# Patient Record
Sex: FEMALE | Race: BLACK OR AFRICAN AMERICAN | Employment: UNEMPLOYED | URBAN - METROPOLITAN AREA
[De-identification: names, ages, dates, MRNs, and addresses within clinical notes are randomized per-mention and may not be internally consistent; named-entity substitution may affect disease eponyms.]

---

## 2017-12-15 ENCOUNTER — APPOINTMENT (OUTPATIENT)
Dept: GENERAL RADIOLOGY | Age: 42
End: 2017-12-15
Attending: PHYSICIAN ASSISTANT
Payer: SELF-PAY

## 2017-12-15 ENCOUNTER — APPOINTMENT (OUTPATIENT)
Dept: CT IMAGING | Age: 42
End: 2017-12-15
Attending: PHYSICIAN ASSISTANT
Payer: SELF-PAY

## 2017-12-15 ENCOUNTER — HOSPITAL ENCOUNTER (EMERGENCY)
Age: 42
Discharge: HOME | End: 2017-12-15
Attending: EMERGENCY MEDICINE
Payer: SELF-PAY

## 2017-12-15 VITALS
OXYGEN SATURATION: 99 % | TEMPERATURE: 98.5 F | RESPIRATION RATE: 14 BRPM | WEIGHT: 245 LBS | HEIGHT: 69 IN | HEART RATE: 78 BPM | SYSTOLIC BLOOD PRESSURE: 126 MMHG | DIASTOLIC BLOOD PRESSURE: 94 MMHG | BODY MASS INDEX: 36.29 KG/M2

## 2017-12-15 DIAGNOSIS — F17.200 NICOTINE DEPENDENCE, UNCOMPLICATED, UNSPECIFIED NICOTINE PRODUCT TYPE: ICD-10-CM

## 2017-12-15 DIAGNOSIS — Z86.73 HISTORY OF CVA (CEREBROVASCULAR ACCIDENT): ICD-10-CM

## 2017-12-15 DIAGNOSIS — M79.10 MYALGIA: Primary | ICD-10-CM

## 2017-12-15 DIAGNOSIS — R20.0 NUMBNESS: ICD-10-CM

## 2017-12-15 LAB
ALBUMIN SERPL-MCNC: 3.3 G/DL (ref 3.5–5)
ALBUMIN/GLOB SERPL: 1 {RATIO} (ref 1.1–2.2)
ALP SERPL-CCNC: 66 U/L (ref 45–117)
ALT SERPL-CCNC: 15 U/L (ref 12–78)
ANION GAP SERPL CALC-SCNC: 14 MMOL/L (ref 5–15)
APPEARANCE UR: ABNORMAL
AST SERPL-CCNC: 11 U/L (ref 15–37)
ATRIAL RATE: 85 BPM
BACTERIA URNS QL MICRO: ABNORMAL /HPF
BASOPHILS # BLD: 0 K/UL (ref 0–0.1)
BASOPHILS NFR BLD: 0 % (ref 0–1)
BILIRUB SERPL-MCNC: 0.5 MG/DL (ref 0.2–1)
BILIRUB UR QL CFM: NEGATIVE
BUN SERPL-MCNC: 10 MG/DL (ref 6–20)
BUN/CREAT SERPL: 11 (ref 12–20)
CALCIUM SERPL-MCNC: 8.2 MG/DL (ref 8.5–10.1)
CALCULATED P AXIS, ECG09: 35 DEGREES
CALCULATED R AXIS, ECG10: -21 DEGREES
CALCULATED T AXIS, ECG11: 8 DEGREES
CHLORIDE SERPL-SCNC: 108 MMOL/L (ref 97–108)
CK MB CFR SERPL CALC: 2.1 % (ref 0–2.5)
CK MB SERPL-MCNC: 1.9 NG/ML (ref 5–25)
CK SERPL-CCNC: 91 U/L (ref 26–192)
CO2 SERPL-SCNC: 19 MMOL/L (ref 21–32)
COLOR UR: ABNORMAL
CREAT SERPL-MCNC: 0.87 MG/DL (ref 0.55–1.02)
DIAGNOSIS, 93000: NORMAL
EOSINOPHIL # BLD: 0.1 K/UL (ref 0–0.4)
EOSINOPHIL NFR BLD: 1 % (ref 0–7)
EPITH CASTS URNS QL MICRO: ABNORMAL /LPF
ERYTHROCYTE [DISTWIDTH] IN BLOOD BY AUTOMATED COUNT: 12.5 % (ref 11.5–14.5)
GLOBULIN SER CALC-MCNC: 3.4 G/DL (ref 2–4)
GLUCOSE SERPL-MCNC: 96 MG/DL (ref 65–100)
GLUCOSE UR STRIP.AUTO-MCNC: NEGATIVE MG/DL
HCT VFR BLD AUTO: 36.5 % (ref 35–47)
HGB BLD-MCNC: 12.8 G/DL (ref 11.5–16)
HGB UR QL STRIP: ABNORMAL
KETONES UR QL STRIP.AUTO: ABNORMAL MG/DL
LEUKOCYTE ESTERASE UR QL STRIP.AUTO: NEGATIVE
LYMPHOCYTES # BLD: 2.5 K/UL (ref 0.8–3.5)
LYMPHOCYTES NFR BLD: 24 % (ref 12–49)
MAGNESIUM SERPL-MCNC: 1.8 MG/DL (ref 1.6–2.4)
MCH RBC QN AUTO: 31.7 PG (ref 26–34)
MCHC RBC AUTO-ENTMCNC: 35.1 G/DL (ref 30–36.5)
MCV RBC AUTO: 90.3 FL (ref 80–99)
MONOCYTES # BLD: 0.6 K/UL (ref 0–1)
MONOCYTES NFR BLD: 6 % (ref 5–13)
NEUTS SEG # BLD: 7 K/UL (ref 1.8–8)
NEUTS SEG NFR BLD: 69 % (ref 32–75)
NITRITE UR QL STRIP.AUTO: NEGATIVE
P-R INTERVAL, ECG05: 158 MS
PH UR STRIP: 6 [PH] (ref 5–8)
PLATELET # BLD AUTO: 182 K/UL (ref 150–400)
POTASSIUM SERPL-SCNC: 3.5 MMOL/L (ref 3.5–5.1)
PROT SERPL-MCNC: 6.7 G/DL (ref 6.4–8.2)
PROT UR STRIP-MCNC: 30 MG/DL
Q-T INTERVAL, ECG07: 388 MS
QRS DURATION, ECG06: 66 MS
QTC CALCULATION (BEZET), ECG08: 461 MS
RBC # BLD AUTO: 4.04 M/UL (ref 3.8–5.2)
RBC #/AREA URNS HPF: ABNORMAL /HPF (ref 0–5)
SODIUM SERPL-SCNC: 141 MMOL/L (ref 136–145)
SP GR UR REFRACTOMETRY: >1.03 (ref 1–1.03)
TROPONIN I SERPL-MCNC: <0.04 NG/ML
UR CULT HOLD, URHOLD: NORMAL
UROBILINOGEN UR QL STRIP.AUTO: 1 EU/DL (ref 0.2–1)
VENTRICULAR RATE, ECG03: 85 BPM
WBC # BLD AUTO: 10.1 K/UL (ref 3.6–11)
WBC URNS QL MICRO: ABNORMAL /HPF (ref 0–4)

## 2017-12-15 PROCEDURE — 81001 URINALYSIS AUTO W/SCOPE: CPT | Performed by: PHYSICIAN ASSISTANT

## 2017-12-15 PROCEDURE — 93005 ELECTROCARDIOGRAM TRACING: CPT

## 2017-12-15 PROCEDURE — 74011250636 HC RX REV CODE- 250/636: Performed by: PHYSICIAN ASSISTANT

## 2017-12-15 PROCEDURE — 84484 ASSAY OF TROPONIN QUANT: CPT | Performed by: PHYSICIAN ASSISTANT

## 2017-12-15 PROCEDURE — 74011250637 HC RX REV CODE- 250/637: Performed by: PHYSICIAN ASSISTANT

## 2017-12-15 PROCEDURE — 36415 COLL VENOUS BLD VENIPUNCTURE: CPT | Performed by: PHYSICIAN ASSISTANT

## 2017-12-15 PROCEDURE — 71020 XR CHEST PA LAT: CPT

## 2017-12-15 PROCEDURE — 85025 COMPLETE CBC W/AUTO DIFF WBC: CPT | Performed by: PHYSICIAN ASSISTANT

## 2017-12-15 PROCEDURE — 70450 CT HEAD/BRAIN W/O DYE: CPT

## 2017-12-15 PROCEDURE — 99285 EMERGENCY DEPT VISIT HI MDM: CPT

## 2017-12-15 PROCEDURE — 96360 HYDRATION IV INFUSION INIT: CPT

## 2017-12-15 PROCEDURE — 83735 ASSAY OF MAGNESIUM: CPT | Performed by: PHYSICIAN ASSISTANT

## 2017-12-15 PROCEDURE — 80053 COMPREHEN METABOLIC PANEL: CPT | Performed by: PHYSICIAN ASSISTANT

## 2017-12-15 PROCEDURE — 82550 ASSAY OF CK (CPK): CPT | Performed by: PHYSICIAN ASSISTANT

## 2017-12-15 RX ORDER — HALOPERIDOL 5 MG/ML
5 INJECTION INTRAMUSCULAR ONCE
Status: ON HOLD | COMMUNITY
End: 2021-02-21

## 2017-12-15 RX ORDER — ASPIRIN 81 MG/1
TABLET ORAL DAILY
COMMUNITY
End: 2021-01-16

## 2017-12-15 RX ORDER — ATORVASTATIN CALCIUM 10 MG/1
40 TABLET, FILM COATED ORAL DAILY
COMMUNITY

## 2017-12-15 RX ORDER — ACETAMINOPHEN 325 MG/1
650 TABLET ORAL ONCE
Status: COMPLETED | OUTPATIENT
Start: 2017-12-15 | End: 2017-12-15

## 2017-12-15 RX ORDER — TOPIRAMATE 100 MG/1
100 TABLET, FILM COATED ORAL 2 TIMES DAILY
COMMUNITY
End: 2021-01-16

## 2017-12-15 RX ORDER — TRAZODONE HYDROCHLORIDE 100 MG/1
75 TABLET ORAL
COMMUNITY
End: 2021-01-16

## 2017-12-15 RX ORDER — GABAPENTIN 600 MG/1
600 TABLET ORAL 2 TIMES DAILY
COMMUNITY
End: 2021-01-16

## 2017-12-15 RX ADMIN — SODIUM CHLORIDE 500 ML: 900 INJECTION, SOLUTION INTRAVENOUS at 15:27

## 2017-12-15 RX ADMIN — ACETAMINOPHEN 650 MG: 325 TABLET ORAL at 15:27

## 2017-12-15 NOTE — ED NOTES
Patient discharged by provider. D/C instructions given. Pt educated to take r medications as instructed for management at home. Pt verbalized understanding, verbalized no questions. PIV removed, pressure dressing applied. Pt ambulated out of ER without difficulty, NAD.   Patient Vitals for the past 4 hrs:   Pulse Resp BP SpO2   12/15/17 1700 78 14 (!) 126/94 99 %   12/15/17 1630 82 16 125/90 100 %   12/15/17 1530 78 17 105/69 98 %   12/15/17 1430 79 19 116/81 98 %

## 2017-12-15 NOTE — ED PROVIDER NOTES
HPI Comments: 36 y.o. female with past medical history significant for asthma, HTN, bipolar I disorder, migraine, TIA and stroke who presents from home with chief complaint of numbness. Per pt, she has been experiencing bilateral arm and leg numbness since her CVA in August of 2017. At that time she notes that physicians were unable to determine if she had suffered a hemorrhagic stroke, however their initial belief was linked to a possible aneurysm. Per pt, she has experienced left sided deficits from her previous stroke, however these appear unchanged. The pt reports that she was started on a cholesterol medication following diagnosis of her CVA and unsure if she has been experiencing side effects. The pt notes that she has not followed up with her PCP for evaluation PTA to the ED. Per mother, the pt has also been experiencing intermittent SOB with exertion over the past few days. The mother notes that the pt has also appeared more \"blunted\" and less like herself since being started on Haldol for her mood. Per pt, she has yet to discuss this with her PCP. The pt makes it known that she has an upcoming Neurology appointment in early January at [address] [place] [address]. Per pt, she usually ambulated with a walker at baseline due to her deficits. She denies fever, chills, cp, SOB, n/v/d, urinary sx or abd pain. There are no other acute medical concerns at this time. Social hx: Current daily smoker (1/2 pack), No ETOH use    PCP: Emmanuel Barr MD    Note written by Katherine Giron, as dictated by Barrington Perry PA-C 3:25 PM            The history is provided by the patient. No  was used. Past Medical History:   Diagnosis Date    Asthma     Bipolar 1 disorder ([facility] [address]     Hypertension     Migraine     Stroke [facility])     2017 august hemmorragic stroke    TIA (transient ischemic attack)        History reviewed. No pertinent surgical history. History reviewed. No pertinent family history.     Social History     Social History    Marital status:      Spouse name: N/A    Number of children: N/A    Years of education: N/A     Occupational History    Not on file. Social History Main Topics    Smoking status: Current Every Day Smoker     Packs/day: 0.50    Smokeless tobacco: Never Used    Alcohol use No    Drug use: No    Sexual activity: Not on file     Other Topics Concern    Not on file     Social History Narrative         ALLERGIES: Imitrex [sumatriptan succinate] and Tramadol    Review of Systems   Constitutional: Negative. Negative for appetite change, chills, fatigue and fever. HENT: Negative. Negative for congestion, ear pain, postnasal drip, rhinorrhea, sneezing and sore throat. Eyes: Negative. Negative for redness and visual disturbance. Respiratory: Positive for shortness of breath (intermittnent with exersion over the past several days ). Negative for cough and wheezing. Cardiovascular: Negative. Negative for chest pain, palpitations and leg swelling. Gastrointestinal: Negative. Negative for abdominal pain, anal bleeding, constipation, diarrhea, nausea and vomiting. Endocrine: Negative. Genitourinary: Negative. Negative for difficulty urinating, dysuria, frequency and hematuria. Musculoskeletal: Positive for myalgias. Negative for arthralgias, back pain and neck stiffness. Skin: Negative. Negative for rash. Allergic/Immunologic: Negative. Negative for immunocompromised state. Neurological: Positive for numbness. Negative for dizziness, syncope, weakness, light-headedness and headaches. Hematological: Negative. Negative for adenopathy. Psychiatric/Behavioral: Negative.         Patient Vitals for the past 12 hrs:   Temp Pulse Resp BP SpO2   12/15/17 1700 - 78 14 (!) 126/94 99 %   12/15/17 1630 - 82 16 125/90 100 %   12/15/17 1530 - 78 17 105/69 98 %   12/15/17 1430 - 79 19 116/81 98 %   12/15/17 1316 98.5 °F (36.9 °C) 64 18 122/78 100 % Physical Exam   Constitutional: She is oriented to person, place, and time. She appears well-developed and well-nourished. No distress. Above average bmi female   HENT:   Head: Normocephalic and atraumatic. Right Ear: External ear normal.   Left Ear: External ear normal.   Nose: Nose normal.   Mouth/Throat: Oropharynx is clear and moist.   Eyes: EOM are normal. Pupils are equal, round, and reactive to light. Neck: Neck supple. Cardiovascular: Normal rate, regular rhythm, normal heart sounds and intact distal pulses. Exam reveals no gallop and no friction rub. No murmur heard. Pulmonary/Chest: Effort normal and breath sounds normal. No stridor. No respiratory distress. She has no wheezes. She has no rales. She exhibits no tenderness. Abdominal: Soft. Bowel sounds are normal. She exhibits no distension and no mass. There is no tenderness. There is no rebound and no guarding. Musculoskeletal: Normal range of motion. She exhibits no edema, tenderness or deformity. Neurological: She is alert and oriented to person, place, and time. No cranial nerve deficit. Coordination normal.   Right sided baseline weakness. + diffuse aching bilaterally worse with movement. No focal ttp. No swelling. No discoloration or lesions. ROM intact. Cap refill brisk normothermic. Distal n/v intact. Skin: No rash noted. No erythema. No pallor. Psychiatric: She has a normal mood and affect. Her behavior is normal.   Nursing note and vitals reviewed.        MDM  Number of Diagnoses or Management Options  History of CVA (cerebrovascular accident):   Myalgia:   Nicotine dependence, uncomplicated, unspecified nicotine product type:   Numbness:      Amount and/or Complexity of Data Reviewed  Clinical lab tests: ordered and reviewed  Tests in the radiology section of CPT®: ordered and reviewed  Tests in the medicine section of CPT®: reviewed and ordered  Obtain history from someone other than the patient: yes (mother)  Review and summarize past medical records: yes  Independent visualization of images, tracings, or specimens: yes    Patient Progress  Patient progress: stable    ED Course       Procedures    1:44 PM  Discussed with the patient the medical risks of prolonged smoking habits and advised the patient of the benefits of the cessation of smoking. The patient verbalized their understanding. CIRO Martinez      ED EKG interpretation: 1:57 PM  Rhythm: normal sinus rhythm; and regular . Rate (approx.): 85; Axis: normal; P wave: normal; QRS interval: normal ; ST/T wave: non specific changes; Other findings: abnormal ekg. This EKG was interpreted by Rizwan Pulido MD,ED Provider. 2:28 PM  Discussed pt, sx, hx and current findings with Dr Erick Pulido. He is in agreement with plan. Will check ekg,labs and urine   Iza Dent. NAVJOT Wilcox    4:54 PM   Updated pt and family on current findings. Pt feeling much better. Will discharge home. Iza Wilcox PA-C    LABORATORY TESTS:  Recent Results (from the past 12 hour(s))   CBC WITH AUTOMATED DIFF    Collection Time: 12/15/17  1:50 PM   Result Value Ref Range    WBC 10.1 3.6 - 11.0 K/uL    RBC 4.04 3.80 - 5.20 M/uL    HGB 12.8 11.5 - 16.0 g/dL    HCT 36.5 35.0 - 47.0 %    MCV 90.3 80.0 - 99.0 FL    MCH 31.7 26.0 - 34.0 PG    MCHC 35.1 30.0 - 36.5 g/dL    RDW 12.5 11.5 - 14.5 %    PLATELET 478 724 - 108 K/uL    NEUTROPHILS 69 32 - 75 %    LYMPHOCYTES 24 12 - 49 %    MONOCYTES 6 5 - 13 %    EOSINOPHILS 1 0 - 7 %    BASOPHILS 0 0 - 1 %    ABS. NEUTROPHILS 7.0 1.8 - 8.0 K/UL    ABS. LYMPHOCYTES 2.5 0.8 - 3.5 K/UL    ABS. MONOCYTES 0.6 0.0 - 1.0 K/UL    ABS. EOSINOPHILS 0.1 0.0 - 0.4 K/UL    ABS.  BASOPHILS 0.0 0.0 - 0.1 K/UL   METABOLIC PANEL, COMPREHENSIVE    Collection Time: 12/15/17  1:50 PM   Result Value Ref Range    Sodium 141 136 - 145 mmol/L    Potassium 3.5 3.5 - 5.1 mmol/L    Chloride 108 97 - 108 mmol/L    CO2 19 (L) 21 - 32 mmol/L    Anion gap 14 5 - 15 mmol/L Glucose 96 65 - 100 mg/dL    BUN 10 6 - 20 MG/DL    Creatinine 0.87 0.55 - 1.02 MG/DL    BUN/Creatinine ratio 11 (L) 12 - 20      GFR est AA >60 >60 ml/min/1.73m2    GFR est non-AA >60 >60 ml/min/1.73m2    Calcium 8.2 (L) 8.5 - 10.1 MG/DL    Bilirubin, total 0.5 0.2 - 1.0 MG/DL    ALT (SGPT) 15 12 - 78 U/L    AST (SGOT) 11 (L) 15 - 37 U/L    Alk.  phosphatase 66 45 - 117 U/L    Protein, total 6.7 6.4 - 8.2 g/dL    Albumin 3.3 (L) 3.5 - 5.0 g/dL    Globulin 3.4 2.0 - 4.0 g/dL    A-G Ratio 1.0 (L) 1.1 - 2.2     MAGNESIUM    Collection Time: 12/15/17  1:50 PM   Result Value Ref Range    Magnesium 1.8 1.6 - 2.4 mg/dL   TROPONIN I    Collection Time: 12/15/17  1:50 PM   Result Value Ref Range    Troponin-I, Qt. <0.04 <0.05 ng/mL   CK W/ CKMB & INDEX    Collection Time: 12/15/17  1:50 PM   Result Value Ref Range    CK 91 26 - 192 U/L    CK - MB 1.9 <3.6 NG/ML    CK-MB Index 2.1 0 - 2.5     EKG, 12 LEAD, INITIAL    Collection Time: 12/15/17  1:57 PM   Result Value Ref Range    Ventricular Rate 85 BPM    Atrial Rate 85 BPM    P-R Interval 158 ms    QRS Duration 66 ms    Q-T Interval 388 ms    QTC Calculation (Bezet) 461 ms    Calculated P Axis 35 degrees    Calculated R Axis -21 degrees    Calculated T Axis 8 degrees    Diagnosis       Normal sinus rhythm  Cannot rule out Anterior infarct , age undetermined  Abnormal ECG  No previous ECGs available  Confirmed by Beck Garrison MD ([id number]) on 12/15/2017 4:53:27 PM     URINALYSIS W/MICROSCOPIC    Collection Time: 12/15/17  2:22 PM   Result Value Ref Range    Color DARK YELLOW      Appearance CLOUDY (A) CLEAR      Specific gravity >1.030 (H) 1.003 - 1.030    pH (UA) 6.0 5.0 - 8.0      Protein 30 (A) NEG mg/dL    Glucose NEGATIVE  NEG mg/dL    Ketone TRACE (A) NEG mg/dL    Blood SMALL (A) NEG      Urobilinogen 1.0 0.2 - 1.0 EU/dL    Nitrites NEGATIVE  NEG      Leukocyte Esterase NEGATIVE  NEG      WBC 0-4 0 - 4 /hpf    RBC 0-5 0 - 5 /hpf    Epithelial cells FEW FEW /lpf    Bacteria 1+ (A) NEG /hpf   URINE CULTURE HOLD SAMPLE    Collection Time: 12/15/17  2:22 PM   Result Value Ref Range    Urine culture hold URINE ON HOLD IN MICROBIOLOGY DEPT FOR 3 DAYS     BILIRUBIN, CONFIRM    Collection Time: 12/15/17  2:22 PM   Result Value Ref Range    Bilirubin UA, confirm NEGATIVE  NEG         IMAGING RESULTS:    Xr Chest Pa Lat    Result Date: 12/15/2017  Clinical indication: Shortness of breath. Frontal and lateral views of the chest obtained, comparison to 2015. The heart size is normal. There is no acute infiltrate or shift. IMPRESSION: No acute findings. Ct Head Wo Cont    Result Date: 12/15/2017  EXAM:  CT HEAD WO CONT INDICATION:   numbness and aching in extremities. hx of cva COMPARISON: 2014. CONTRAST:  None. TECHNIQUE: Unenhanced CT of the head was performed using 5 mm images. Brain and bone windows were generated. CT dose reduction was achieved through use of a standardized protocol tailored for this examination and automatic exposure control for dose modulation. FINDINGS: There is no extra-axial fluid collection hemorrhage shift or masses. Ventricles are normal in size midline. impression: No acute findings. MEDICATIONS GIVEN:  Medications   acetaminophen (TYLENOL) tablet 650 mg (650 mg Oral Given 12/15/17 1527)   sodium chloride 0.9 % bolus infusion 500 mL (500 mL IntraVENous New Bag 12/15/17 1527)       IMPRESSION:  1. Myalgia    2. Numbness    3. History of CVA (cerebrovascular accident)    4. Nicotine dependence, uncomplicated, unspecified nicotine product type        PLAN:  1. Current Discharge Medication List      CONTINUE these medications which have NOT CHANGED    Details   gabapentin (NEURONTIN) 600 mg tablet Take 600 mg by mouth two (2) times a day. atorvastatin (LIPITOR) 10 mg tablet Take 10 mg by mouth daily. haloperidol lactate (HALDOL) 5 mg/mL injection 5 mg by IntraMUSCular route once.       traZODone (DESYREL) 100 mg tablet Take 75 mg by mouth nightly. topiramate (TOPAMAX) 100 mg tablet Take 100 mg by mouth two (2) times a day. aspirin delayed-release 81 mg tablet Take  by mouth daily. amLODIPine (NORVASC) 5 mg tablet Take 5 mg by mouth daily. hydrochlorothiazide (HYDRODIURIL) 25 mg tablet Take 25 mg by mouth daily. 2.   Follow-up Information     Follow up With Details Comments Contact [address] [place], [place] Schedule an appointment as soon as possible for a visit 2-4 days for recheck [address] [facility]  [phone]      your neurologist Schedule an appointment as soon as possible for a visit 2-4 days for recheck         Return to ED if worse     4:54 PM  Pt has been reexamined. Pt has no new complaints, changes or physical findings. Care plan outlined and precautions discussed. All available results were reviewed with pt. All medications were reviewed with pt. All of pt's questions and concerns were addressed. Pt agrees to F/U as instructed and agrees to return to ED upon further deterioration. Pt is ready to go home.   CIRO Ferrell

## 2017-12-15 NOTE — DISCHARGE INSTRUCTIONS
Muscle Aches: Care Instructions  Your Care Instructions    Muscle aches have many possible causes. Some common ones are overuse, tension, and injuries such as a strained muscle. An infection such as the flu can cause muscle aches. Or the aches may be caused by some medicines, such as statins. Muscle aches may also be a symptom of a disease like lupus or fibromyalgia. Myalgia is the medical term for muscle aches. The doctor will do a physical exam and ask questions to try to find what is causing your pain. You may also have tests such as blood tests or imaging tests like X-rays. These can help find or rule out serious problems. The doctor has checked you carefully, but problems can develop later. If you notice any problems or new symptoms, get medical treatment right away. Follow-up care is a key part of your treatment and safety. Be sure to make and go to all appointments, and call your doctor if you are having problems. It's also a good idea to know your test results and keep a list of the medicines you take. How can you care for yourself at home? · Rest the area that hurts. You may need to stop or reduce the activity that causes your symptoms. Then you can return to it slowly. · Put ice or a cold pack on the area for 10 to 20 minutes at a time to ease pain. Put a thin cloth between the ice and your skin. · Take an over-the-counter pain medicine, such as acetaminophen (Tylenol), ibuprofen (Advil, Motrin), or naproxen (Aleve). Be safe with medicines. Read and follow all instructions on the label. When should you call for help? Call your doctor now or seek immediate medical care if:  ? · Your pain gets worse. ? · You have new symptoms, such as a fever, swelling, or a rash. ? Watch closely for changes in your health, and be sure to contact your doctor if:  ? · You do not get better as expected. Where can you learn more? Go to http://ada-josh.info/.   Enter G355 in the search box to learn more about \"Muscle Aches: Care Instructions. \"  Current as of: October 14, 2016  Content Version: 11.4  © 9031-1252 [employer]. Care instructions adapted under license by [employer] (which disclaims liability or warranty for this information). If you have questions about a medical condition or this instruction, always ask your healthcare professional. [address] any warranty or liability for your use of this information. We hope that we have addressed all of your medical concerns. The examination and treatment you received in the Emergency Department were for an emergent problem and were not intended as complete care. It is important that you follow up with your healthcare provider(s) for ongoing care. If your symptoms worsen or do not improve as expected, and you are unable to reach your usual health care provider(s), you should return to the Emergency Department. Today's healthcare is undergoing tremendous change, and patient satisfaction surveys are one of the many tools to assess the quality of medical care. You may receive a survey from the [employer] regarding your experience in the Emergency Department. I hope that your experience has been completely positive, particularly the medical care that I provided. As such, please participate in the survey; anything less than excellent does not meet my expectations or intentions. [facility] and [facility] participate in nationally recognized quality of care measures. If your blood pressure is greater than 120/80, as reported below, we urge that you seek medical care to address the potential of high blood pressure, commonly known as hypertension. Hypertension can be hereditary or can be caused by certain medical conditions, pain, stress, or \"white coat syndrome. \"       Please make an appointment with your health care provider(s) for follow up of your Emergency Department visit. VITALS:   Patient Vitals for the past 8 hrs:   Temp Pulse Resp BP SpO2   12/15/17 1700 - 78 14 (!) 126/94 99 %   12/15/17 1630 - 82 16 125/90 100 %   12/15/17 1530 - 78 17 105/69 98 %   12/15/17 1430 - 79 19 116/81 98 %   12/15/17 1316 98.5 °F (36.9 °C) 64 18 122/78 100 %          Thank you for allowing us to provide you with medical care today. We realize that you have many choices for your emergency care needs. Please choose us in the future for any continued health care needs. Aston Wilcox, [email or web address].   Office: [phone]            Recent Results (from the past 24 hour(s))   CBC WITH AUTOMATED DIFF    Collection Time: 12/15/17  1:50 PM   Result Value Ref Range    WBC 10.1 3.6 - 11.0 K/uL    RBC 4.04 3.80 - 5.20 M/uL    HGB 12.8 11.5 - 16.0 g/dL    HCT 36.5 35.0 - 47.0 %    MCV 90.3 80.0 - 99.0 FL    MCH 31.7 26.0 - 34.0 PG    MCHC 35.1 30.0 - 36.5 g/dL    RDW 12.5 11.5 - 14.5 %    PLATELET 616 332 - 290 K/uL    NEUTROPHILS 69 32 - 75 %    LYMPHOCYTES 24 12 - 49 %    MONOCYTES 6 5 - 13 %    EOSINOPHILS 1 0 - 7 %    BASOPHILS 0 0 - 1 %    ABS. NEUTROPHILS 7.0 1.8 - 8.0 K/UL    ABS. LYMPHOCYTES 2.5 0.8 - 3.5 K/UL    ABS. MONOCYTES 0.6 0.0 - 1.0 K/UL    ABS. EOSINOPHILS 0.1 0.0 - 0.4 K/UL    ABS.  BASOPHILS 0.0 0.0 - 0.1 K/UL   METABOLIC PANEL, COMPREHENSIVE    Collection Time: 12/15/17  1:50 PM   Result Value Ref Range    Sodium 141 136 - 145 mmol/L    Potassium 3.5 3.5 - 5.1 mmol/L    Chloride 108 97 - 108 mmol/L    CO2 19 (L) 21 - 32 mmol/L    Anion gap 14 5 - 15 mmol/L    Glucose 96 65 - 100 mg/dL    BUN 10 6 - 20 MG/DL    Creatinine 0.87 0.55 - 1.02 MG/DL    BUN/Creatinine ratio 11 (L) 12 - 20      GFR est AA >60 >60 ml/min/1.73m2    GFR est non-AA >60 >60 ml/min/1.73m2    Calcium 8.2 (L) 8.5 - 10.1 MG/DL    Bilirubin, total 0.5 0.2 - 1.0 MG/DL    ALT (SGPT) 15 12 - 78 U/L    AST (SGOT) 11 (L) 15 - 37 U/L    Alk.  phosphatase 66 45 - 117 U/L    Protein, total 6.7 6.4 - 8.2 g/dL    Albumin 3.3 (L) 3.5 - 5.0 g/dL    Globulin 3.4 2.0 - 4.0 g/dL    A-G Ratio 1.0 (L) 1.1 - 2.2     MAGNESIUM    Collection Time: 12/15/17  1:50 PM   Result Value Ref Range    Magnesium 1.8 1.6 - 2.4 mg/dL   TROPONIN I    Collection Time: 12/15/17  1:50 PM   Result Value Ref Range    Troponin-I, Qt. <0.04 <0.05 ng/mL   CK W/ CKMB & INDEX    Collection Time: 12/15/17  1:50 PM   Result Value Ref Range    CK 91 26 - 192 U/L    CK - MB 1.9 <3.6 NG/ML    CK-MB Index 2.1 0 - 2.5     EKG, 12 LEAD, INITIAL    Collection Time: 12/15/17  1:57 PM   Result Value Ref Range    Ventricular Rate 85 BPM    Atrial Rate 85 BPM    P-R Interval 158 ms    QRS Duration 66 ms    Q-T Interval 388 ms    QTC Calculation (Bezet) 461 ms    Calculated P Axis 35 degrees    Calculated R Axis -21 degrees    Calculated T Axis 8 degrees    Diagnosis       Normal sinus rhythm  Cannot rule out Anterior infarct , age undetermined  Abnormal ECG  No previous ECGs available  Confirmed by Beck Garrison MD ([id number]) on 12/15/2017 4:53:27 PM     URINALYSIS W/MICROSCOPIC    Collection Time: 12/15/17  2:22 PM   Result Value Ref Range    Color DARK YELLOW      Appearance CLOUDY (A) CLEAR      Specific gravity >1.030 (H) 1.003 - 1.030    pH (UA) 6.0 5.0 - 8.0      Protein 30 (A) NEG mg/dL    Glucose NEGATIVE  NEG mg/dL    Ketone TRACE (A) NEG mg/dL    Blood SMALL (A) NEG      Urobilinogen 1.0 0.2 - 1.0 EU/dL    Nitrites NEGATIVE  NEG      Leukocyte Esterase NEGATIVE  NEG      WBC 0-4 0 - 4 /hpf    RBC 0-5 0 - 5 /hpf    Epithelial cells FEW FEW /lpf    Bacteria 1+ (A) NEG /hpf   URINE CULTURE HOLD SAMPLE    Collection Time: 12/15/17  2:22 PM   Result Value Ref Range    Urine culture hold URINE ON HOLD IN MICROBIOLOGY DEPT FOR 3 DAYS     BILIRUBIN, CONFIRM    Collection Time: 12/15/17  2:22 PM   Result Value Ref Range    Bilirubin UA, confirm NEGATIVE  NEG         Xr Chest Pa Lat    Result Date: 12/15/2017  Clinical indication: Shortness of breath. Frontal and lateral views of the chest obtained, comparison to 2015. The heart size is normal. There is no acute infiltrate or shift. IMPRESSION: No acute findings. Ct Head Wo Cont    Result Date: 12/15/2017  EXAM:  CT HEAD WO CONT INDICATION:   numbness and aching in extremities. hx of cva COMPARISON: 2014. CONTRAST:  None. TECHNIQUE: Unenhanced CT of the head was performed using 5 mm images. Brain and bone windows were generated. CT dose reduction was achieved through use of a standardized protocol tailored for this examination and automatic exposure control for dose modulation. FINDINGS: There is no extra-axial fluid collection hemorrhage shift or masses. Ventricles are normal in size midline. impression: No acute findings.

## 2018-09-05 ENCOUNTER — ED HISTORICAL/CONVERTED ENCOUNTER (OUTPATIENT)
Dept: OTHER | Age: 43
End: 2018-09-05

## 2019-01-21 ENCOUNTER — ED HISTORICAL/CONVERTED ENCOUNTER (OUTPATIENT)
Dept: OTHER | Age: 44
End: 2019-01-21

## 2020-08-14 ENCOUNTER — ED HISTORICAL/CONVERTED ENCOUNTER (OUTPATIENT)
Dept: OTHER | Age: 45
End: 2020-08-14

## 2020-09-10 ENCOUNTER — ED HISTORICAL/CONVERTED ENCOUNTER (OUTPATIENT)
Dept: OTHER | Age: 45
End: 2020-09-10

## 2021-01-16 ENCOUNTER — HOSPITAL ENCOUNTER (EMERGENCY)
Age: 46
Discharge: HOME | End: 2021-01-16
Attending: EMERGENCY MEDICINE
Payer: MEDICARE

## 2021-01-16 VITALS
WEIGHT: 225 LBS | HEART RATE: 105 BPM | SYSTOLIC BLOOD PRESSURE: 133 MMHG | TEMPERATURE: 98.9 F | HEIGHT: 69 IN | DIASTOLIC BLOOD PRESSURE: 100 MMHG | OXYGEN SATURATION: 95 % | BODY MASS INDEX: 33.33 KG/M2 | RESPIRATION RATE: 16 BRPM

## 2021-01-16 DIAGNOSIS — R11.10 ABDOMINAL PAIN, VOMITING, AND DIARRHEA: Primary | ICD-10-CM

## 2021-01-16 DIAGNOSIS — R19.7 ABDOMINAL PAIN, VOMITING, AND DIARRHEA: Primary | ICD-10-CM

## 2021-01-16 DIAGNOSIS — R10.9 ABDOMINAL PAIN, VOMITING, AND DIARRHEA: Primary | ICD-10-CM

## 2021-01-16 DIAGNOSIS — E87.6 HYPOKALEMIA: ICD-10-CM

## 2021-01-16 LAB
ALBUMIN SERPL-MCNC: 3.6 G/DL (ref 3.5–5)
ALBUMIN/GLOB SERPL: 0.9 {RATIO} (ref 1.1–2.2)
ALP SERPL-CCNC: 98 U/L (ref 45–117)
ALT SERPL-CCNC: 20 U/L (ref 12–78)
ANION GAP SERPL CALC-SCNC: 10 MMOL/L (ref 5–15)
APPEARANCE UR: CLEAR
AST SERPL W P-5'-P-CCNC: 18 U/L (ref 15–37)
BACTERIA URNS QL MICRO: ABNORMAL /HPF
BASOPHILS # BLD: 0 K/UL (ref 0–0.1)
BASOPHILS NFR BLD: 0 % (ref 0–1)
BILIRUB SERPL-MCNC: 0.8 MG/DL (ref 0.2–1)
BILIRUB UR QL: NEGATIVE
BUN SERPL-MCNC: 10 MG/DL (ref 6–20)
BUN/CREAT SERPL: 12 (ref 12–20)
CA-I BLD-MCNC: 8.8 MG/DL (ref 8.5–10.1)
CHLORIDE SERPL-SCNC: 100 MMOL/L (ref 97–108)
CO2 SERPL-SCNC: 28 MMOL/L (ref 21–32)
COLOR UR: YELLOW
CREAT SERPL-MCNC: 0.82 MG/DL (ref 0.55–1.02)
DIFFERENTIAL METHOD BLD: NORMAL
EOSINOPHIL # BLD: 0.1 K/UL (ref 0–0.4)
EOSINOPHIL NFR BLD: 1 % (ref 0–7)
EPITH CASTS URNS QL MICRO: ABNORMAL /LPF
ERYTHROCYTE [DISTWIDTH] IN BLOOD BY AUTOMATED COUNT: 12.3 % (ref 11.5–14.5)
GLOBULIN SER CALC-MCNC: 4.1 G/DL (ref 2–4)
GLUCOSE SERPL-MCNC: 97 MG/DL (ref 65–100)
GLUCOSE UR STRIP.AUTO-MCNC: NEGATIVE MG/DL
HCT VFR BLD AUTO: 39.8 % (ref 35–47)
HGB BLD-MCNC: 13.9 G/DL (ref 11.5–16)
HGB UR QL STRIP: ABNORMAL
IMM GRANULOCYTES # BLD AUTO: 0 K/UL (ref 0–0.04)
IMM GRANULOCYTES NFR BLD AUTO: 0 % (ref 0–0.5)
KETONES UR QL STRIP.AUTO: NEGATIVE MG/DL
LEUKOCYTE ESTERASE UR QL STRIP.AUTO: ABNORMAL
LIPASE SERPL-CCNC: 66 U/L (ref 73–393)
LYMPHOCYTES # BLD: 2.7 K/UL (ref 0.8–3.5)
LYMPHOCYTES NFR BLD: 33 % (ref 12–49)
MCH RBC QN AUTO: 31.4 PG (ref 26–34)
MCHC RBC AUTO-ENTMCNC: 34.9 G/DL (ref 30–36.5)
MCV RBC AUTO: 89.8 FL (ref 80–99)
MONOCYTES # BLD: 0.6 K/UL (ref 0–1)
MONOCYTES NFR BLD: 8 % (ref 5–13)
NEUTS SEG # BLD: 4.8 K/UL (ref 1.8–8)
NEUTS SEG NFR BLD: 58 % (ref 32–75)
NITRITE UR QL STRIP.AUTO: NEGATIVE
PH UR STRIP: 7 [PH] (ref 5–8)
PLATELET # BLD AUTO: 247 K/UL (ref 150–400)
PMV BLD AUTO: 9.9 FL (ref 8.9–12.9)
POTASSIUM SERPL-SCNC: 2.8 MMOL/L (ref 3.5–5.1)
PROT SERPL-MCNC: 7.7 G/DL (ref 6.4–8.2)
PROT UR STRIP-MCNC: NEGATIVE MG/DL
RBC # BLD AUTO: 4.43 M/UL (ref 3.8–5.2)
RBC #/AREA URNS HPF: ABNORMAL /HPF (ref 0–5)
SODIUM SERPL-SCNC: 138 MMOL/L (ref 136–145)
SP GR UR REFRACTOMETRY: 1.01 (ref 1–1.03)
UA: UC IF INDICATED,UAUC: ABNORMAL
UROBILINOGEN UR QL STRIP.AUTO: 0.1 EU/DL (ref 0.2–1)
WBC # BLD AUTO: 8.3 K/UL (ref 3.6–11)
WBC URNS QL MICRO: ABNORMAL /HPF (ref 0–4)

## 2021-01-16 PROCEDURE — 96374 THER/PROPH/DIAG INJ IV PUSH: CPT

## 2021-01-16 PROCEDURE — 80053 COMPREHEN METABOLIC PANEL: CPT

## 2021-01-16 PROCEDURE — 85025 COMPLETE CBC W/AUTO DIFF WBC: CPT

## 2021-01-16 PROCEDURE — 74011250637 HC RX REV CODE- 250/637: Performed by: EMERGENCY MEDICINE

## 2021-01-16 PROCEDURE — 96361 HYDRATE IV INFUSION ADD-ON: CPT

## 2021-01-16 PROCEDURE — 81001 URINALYSIS AUTO W/SCOPE: CPT

## 2021-01-16 PROCEDURE — 83690 ASSAY OF LIPASE: CPT

## 2021-01-16 PROCEDURE — 99283 EMERGENCY DEPT VISIT LOW MDM: CPT

## 2021-01-16 PROCEDURE — 87086 URINE CULTURE/COLONY COUNT: CPT

## 2021-01-16 PROCEDURE — 74011250636 HC RX REV CODE- 250/636: Performed by: EMERGENCY MEDICINE

## 2021-01-16 PROCEDURE — 36415 COLL VENOUS BLD VENIPUNCTURE: CPT

## 2021-01-16 RX ORDER — POTASSIUM CHLORIDE 750 MG/1
40 TABLET, FILM COATED, EXTENDED RELEASE ORAL
Status: COMPLETED | OUTPATIENT
Start: 2021-01-16 | End: 2021-01-16

## 2021-01-16 RX ORDER — POTASSIUM CHLORIDE 750 MG/1
10 CAPSULE, EXTENDED RELEASE ORAL 2 TIMES DAILY
Qty: 10 CAP | Refills: 0 | Status: ON HOLD | OUTPATIENT
Start: 2021-01-16 | End: 2021-02-21

## 2021-01-16 RX ORDER — ONDANSETRON 2 MG/ML
4 INJECTION INTRAMUSCULAR; INTRAVENOUS
Status: COMPLETED | OUTPATIENT
Start: 2021-01-16 | End: 2021-01-16

## 2021-01-16 RX ORDER — ONDANSETRON 4 MG/1
4 TABLET, ORALLY DISINTEGRATING ORAL
Qty: 8 TAB | Refills: 0 | Status: ON HOLD | OUTPATIENT
Start: 2021-01-16 | End: 2021-02-21

## 2021-01-16 RX ORDER — DICYCLOMINE HYDROCHLORIDE 10 MG/1
20 CAPSULE ORAL ONCE
Status: COMPLETED | OUTPATIENT
Start: 2021-01-16 | End: 2021-01-16

## 2021-01-16 RX ADMIN — ONDANSETRON 4 MG: 2 INJECTION INTRAMUSCULAR; INTRAVENOUS at 10:14

## 2021-01-16 RX ADMIN — DICYCLOMINE HYDROCHLORIDE 20 MG: 10 CAPSULE ORAL at 10:14

## 2021-01-16 RX ADMIN — SODIUM CHLORIDE 1000 ML: 9 INJECTION, SOLUTION INTRAVENOUS at 10:13

## 2021-01-16 RX ADMIN — POTASSIUM CHLORIDE 40 MEQ: 750 TABLET, FILM COATED, EXTENDED RELEASE ORAL at 12:35

## 2021-01-16 NOTE — LETTER
[address] [address] [place] [id number] 
[phone] Work/School Note Date: 1/16/2021 To Whom It May concern: 
 
Vinod Hays was seen and treated today in the emergency room by the following provider(s): 
Attending Provider: Denae Marley MD.   
 
Vinod Hays is excused from work/school on 01/16/21 and 01/18/2021. She is medically clear to return to work/school on 01/19/2021, patient may return earlier as tolerated. Sincerely, Codi Zamorano

## 2021-01-16 NOTE — ED PROVIDER NOTES
EMERGENCY DEPARTMENT HISTORY AND PHYSICAL EXAM      Date: 1/16/2021  Patient Name: Nevaeh Mccabe    History of Presenting Illness     Chief Complaint   Patient presents with    Vomiting    Diarrhea       History Provided By: Patient    HPI: Nevaeh Mccabe, 54 y.o. female presents to the ED with onset this morning of nausea, vomiting, and diarrhea. Denies rectal bleeding. Reports diffuse abdominal crampy pain. Denies fevers or chills. States works in a [employer]. Gets tested for COVID 2x/week. Last test 2 days ago and negative. There are no other complaints, changes, or physical findings at this time. PCP: Db Lucas MD    No current facility-administered medications on file prior to encounter. Current Outpatient Medications on File Prior to Encounter   Medication Sig Dispense Refill    apixaban (Eliquis) 5 mg tablet Take 5 mg by mouth two (2) times a day.  atorvastatin (LIPITOR) 10 mg tablet Take 10 mg by mouth daily.  haloperidol lactate (HALDOL) 5 mg/mL injection 5 mg by IntraMUSCular route once.  amLODIPine (NORVASC) 5 mg tablet Take 5 mg by mouth daily.  hydrochlorothiazide (HYDRODIURIL) 25 mg tablet Take 25 mg by mouth daily. Past History     Past Medical History:  Past Medical History:   Diagnosis Date    Asthma     Bipolar 1 disorder ([facility] [address]     Hypertension     Migraine     Stroke [facility])     2017 august hemmorragic stroke    TIA (transient ischemic attack)        Past Surgical History:  No past surgical history on file. Family History:  History reviewed. No pertinent family history. Social History:  Social History     Tobacco Use    Smoking status: Current Every Day Smoker     Packs/day: 0.50    Smokeless tobacco: Never Used   Substance Use Topics    Alcohol use: No    Drug use: No       Allergies:   Allergies   Allergen Reactions    Imitrex [Sumatriptan Succinate] Anaphylaxis    Fioricet [Butalbital-Acetaminophen-Caff] Nausea and Vomiting    Tramadol Nausea and Vomiting         Review of Systems     Review of Systems   Constitutional: Negative for chills and fever. HENT: Negative for congestion and sore throat. Eyes: Negative for pain and redness. Respiratory: Negative for cough and shortness of breath. Cardiovascular: Negative for chest pain and leg swelling. Gastrointestinal: Positive for abdominal pain, diarrhea, nausea and vomiting. Endocrine: Negative for cold intolerance and heat intolerance. Genitourinary: Negative for dysuria, frequency, hematuria and urgency. Musculoskeletal: Negative for arthralgias and myalgias. Skin: Negative for pallor and rash. Neurological: Negative for dizziness, numbness and headaches. Psychiatric/Behavioral: Negative for agitation and dysphoric mood. Physical Exam     Physical Exam  Vitals signs and nursing note reviewed. Constitutional:       Appearance: Normal appearance. HENT:      Head: Normocephalic and atraumatic. Nose: Nose normal.      Mouth/Throat:      Mouth: Mucous membranes are moist.      Pharynx: Oropharynx is clear. Eyes:      Extraocular Movements: Extraocular movements intact. Conjunctiva/sclera: Conjunctivae normal.      Pupils: Pupils are equal, round, and reactive to light. Neck:      Musculoskeletal: Neck supple. Cardiovascular:      Rate and Rhythm: Normal rate and regular rhythm. Heart sounds: Normal heart sounds. No murmur. No friction rub. No gallop. Pulmonary:      Effort: Pulmonary effort is normal.      Breath sounds: Normal breath sounds. No wheezing, rhonchi or rales. Abdominal:      General: Abdomen is flat. Bowel sounds are normal. There is no distension. Palpations: Abdomen is soft. Tenderness: There is abdominal tenderness. There is no guarding or rebound. Comments: Mild, diffuse tenderness   Musculoskeletal: Normal range of motion. Skin:     General: Skin is warm and dry.       Findings: No erythema or rash. Neurological:      General: No focal deficit present. Mental Status: She is alert and oriented to person, place, and time. Psychiatric:         Mood and Affect: Mood normal.         Behavior: Behavior normal.         Diagnostic Study Results     Labs -     No results found for this or any previous visit (from the past 12 hour(s)). Radiologic Studies -   @lastxrresult@  CT Results  (Last 48 hours)    None        CXR Results  (Last 48 hours)    None            Medical Decision Making   I am the first provider for this patient. I reviewed the vital signs, available nursing notes, past medical history, past surgical history, family history and social history. Vital Signs-Reviewed the patient's vital signs. No data found. Records Reviewed: Nursing Notes        Provider Notes (Medical Decision Making):   [facility]         ED Course:   Initial assessment performed. The patients presenting problems have been discussed, and they are in agreement with the care plan formulated and outlined with them. I have encouraged them to ask questions as they arise throughout their visit. PROCEDURES  Procedures       PLAN:  1. Discharge Medication List as of 1/16/2021 12:36 PM      START taking these medications    Details   ondansetron (Zofran ODT) 4 mg disintegrating tablet 1 Tab by SubLINGual route every six (6) hours as needed for Nausea or Vomiting., Normal, Disp-8 Tab, R-0      potassium chloride SA (MICRO-K) 10 mEq capsule Take 1 Cap by mouth two (2) times a day., Normal, Disp-10 Cap, R-0         CONTINUE these medications which have NOT CHANGED    Details   apixaban (Eliquis) 5 mg tablet Take 5 mg by mouth two (2) times a day., Historical Med      atorvastatin (LIPITOR) 10 mg tablet Take 10 mg by mouth daily. , Historical Med      haloperidol lactate (HALDOL) 5 mg/mL injection 5 mg by IntraMUSCular route once., Historical Med      amLODIPine (NORVASC) 5 mg tablet Take 5 mg by mouth daily., Historical Med      hydrochlorothiazide (HYDRODIURIL) 25 mg tablet Take 25 mg by mouth daily. , Historical Med           2. Follow-up Information     Follow up With Specialties Details Why Contact Info    Hollie Short MD Family Medicine In 2 days  [address] [facility] [address]  [phone]          Return to ED if worse     Diagnosis     Clinical Impression:   1. Abdominal pain, vomiting, and diarrhea    2.  Hypokalemia

## 2021-01-16 NOTE — DISCHARGE INSTRUCTIONS
Zofran as prescribed as needed for nausea and vomiting. Be sure to drink plenty of fluids and get rest.  Clear liquids at first, then advance her diet as tolerated. Your potassium was low today. I have given you prescription for potassium to take as prescribed for the next 5 days. You will need to get your potassium rechecked by your doctor next week. Return to the emergency department with any worsening, new, or worrisome symptoms.

## 2021-01-17 LAB
BACTERIA SPEC CULT: NORMAL
COLONY COUNT,CNT: NORMAL
COLONY COUNT,CNT: NORMAL
SPECIAL REQUESTS,SREQ: NORMAL

## 2021-02-20 ENCOUNTER — APPOINTMENT (OUTPATIENT)
Dept: CT IMAGING | Age: 46
End: 2021-02-20
Attending: EMERGENCY MEDICINE
Payer: MEDICARE

## 2021-02-20 ENCOUNTER — HOSPITAL ENCOUNTER (OUTPATIENT)
Age: 46
Setting detail: OBSERVATION
Discharge: HOME | End: 2021-02-21
Attending: EMERGENCY MEDICINE | Admitting: HOSPITALIST
Payer: MEDICARE

## 2021-02-20 ENCOUNTER — APPOINTMENT (OUTPATIENT)
Dept: GENERAL RADIOLOGY | Age: 46
End: 2021-02-20
Attending: EMERGENCY MEDICINE
Payer: MEDICARE

## 2021-02-20 DIAGNOSIS — R53.1 RIGHT SIDED WEAKNESS: Primary | ICD-10-CM

## 2021-02-20 PROBLEM — G45.9 TIA (TRANSIENT ISCHEMIC ATTACK): Status: ACTIVE | Noted: 2021-02-20

## 2021-02-20 PROBLEM — R29.898 RIGHT ARM WEAKNESS: Status: ACTIVE | Noted: 2021-02-20

## 2021-02-20 LAB
ALBUMIN SERPL-MCNC: 3.3 G/DL (ref 3.5–5)
ALBUMIN/GLOB SERPL: 0.9 {RATIO} (ref 1.1–2.2)
ALP SERPL-CCNC: 89 U/L (ref 45–117)
ALT SERPL-CCNC: 16 U/L (ref 12–78)
ANION GAP SERPL CALC-SCNC: 5 MMOL/L (ref 5–15)
APTT PPP: 30.7 SEC (ref 23–35.7)
AST SERPL W P-5'-P-CCNC: 11 U/L (ref 15–37)
BASOPHILS # BLD: 0 K/UL (ref 0–0.1)
BASOPHILS NFR BLD: 0 % (ref 0–1)
BILIRUB SERPL-MCNC: 0.4 MG/DL (ref 0.2–1)
BNP SERPL-MCNC: 80 PG/ML
BUN SERPL-MCNC: 5 MG/DL (ref 6–20)
BUN/CREAT SERPL: 6 (ref 12–20)
CA-I BLD-MCNC: 8.4 MG/DL (ref 8.5–10.1)
CHLORIDE SERPL-SCNC: 110 MMOL/L (ref 97–108)
CO2 SERPL-SCNC: 25 MMOL/L (ref 21–32)
COVID-19 RAPID TEST, COVR: NOT DETECTED
CREAT SERPL-MCNC: 0.8 MG/DL (ref 0.55–1.02)
DIFFERENTIAL METHOD BLD: NORMAL
EOSINOPHIL # BLD: 0.3 K/UL (ref 0–0.4)
EOSINOPHIL NFR BLD: 4 % (ref 0–7)
ERYTHROCYTE [DISTWIDTH] IN BLOOD BY AUTOMATED COUNT: 13.6 % (ref 11.5–14.5)
GLOBULIN SER CALC-MCNC: 3.8 G/DL (ref 2–4)
GLUCOSE SERPL-MCNC: 92 MG/DL (ref 65–100)
HCT VFR BLD AUTO: 38.7 % (ref 35–47)
HGB BLD-MCNC: 13.1 G/DL (ref 11.5–16)
IMM GRANULOCYTES # BLD AUTO: 0 K/UL (ref 0–0.04)
IMM GRANULOCYTES NFR BLD AUTO: 0 % (ref 0–0.5)
INR PPP: 1.1 (ref 0.9–1.1)
LYMPHOCYTES # BLD: 3.2 K/UL (ref 0.8–3.5)
LYMPHOCYTES NFR BLD: 38 % (ref 12–49)
MAGNESIUM SERPL-MCNC: 2 MG/DL (ref 1.6–2.4)
MCH RBC QN AUTO: 31.3 PG (ref 26–34)
MCHC RBC AUTO-ENTMCNC: 33.9 G/DL (ref 30–36.5)
MCV RBC AUTO: 92.4 FL (ref 80–99)
MONOCYTES # BLD: 0.5 K/UL (ref 0–1)
MONOCYTES NFR BLD: 5 % (ref 5–13)
NEUTS SEG # BLD: 4.5 K/UL (ref 1.8–8)
NEUTS SEG NFR BLD: 53 % (ref 32–75)
PLATELET # BLD AUTO: 278 K/UL (ref 150–400)
PMV BLD AUTO: 10.4 FL (ref 8.9–12.9)
POTASSIUM SERPL-SCNC: 3.6 MMOL/L (ref 3.5–5.1)
PROT SERPL-MCNC: 7.1 G/DL (ref 6.4–8.2)
PROTHROMBIN TIME: 14 SEC (ref 11.9–14.7)
RBC # BLD AUTO: 4.19 M/UL (ref 3.8–5.2)
SARS-COV-2, COV2: NORMAL
SODIUM SERPL-SCNC: 140 MMOL/L (ref 136–145)
SPECIMEN SOURCE: NORMAL
THERAPEUTIC RANGE,PTTT: NORMAL SEC (ref 68–109)
TROPONIN I SERPL-MCNC: <0.05 NG/ML
TSH SERPL DL<=0.05 MIU/L-ACNC: 2.48 UIU/ML (ref 0.36–3.74)
WBC # BLD AUTO: 8.4 K/UL (ref 3.6–11)

## 2021-02-20 PROCEDURE — 99285 EMERGENCY DEPT VISIT HI MDM: CPT

## 2021-02-20 PROCEDURE — 84484 ASSAY OF TROPONIN QUANT: CPT

## 2021-02-20 PROCEDURE — 36415 COLL VENOUS BLD VENIPUNCTURE: CPT

## 2021-02-20 PROCEDURE — 85730 THROMBOPLASTIN TIME PARTIAL: CPT

## 2021-02-20 PROCEDURE — 96374 THER/PROPH/DIAG INJ IV PUSH: CPT

## 2021-02-20 PROCEDURE — 84443 ASSAY THYROID STIM HORMONE: CPT

## 2021-02-20 PROCEDURE — 74011250636 HC RX REV CODE- 250/636: Performed by: EMERGENCY MEDICINE

## 2021-02-20 PROCEDURE — 85025 COMPLETE CBC W/AUTO DIFF WBC: CPT

## 2021-02-20 PROCEDURE — 70450 CT HEAD/BRAIN W/O DYE: CPT

## 2021-02-20 PROCEDURE — 87635 SARS-COV-2 COVID-19 AMP PRB: CPT

## 2021-02-20 PROCEDURE — 96375 TX/PRO/DX INJ NEW DRUG ADDON: CPT

## 2021-02-20 PROCEDURE — 83880 ASSAY OF NATRIURETIC PEPTIDE: CPT

## 2021-02-20 PROCEDURE — 74011000636 HC RX REV CODE- 636: Performed by: EMERGENCY MEDICINE

## 2021-02-20 PROCEDURE — 80053 COMPREHEN METABOLIC PANEL: CPT

## 2021-02-20 PROCEDURE — 83735 ASSAY OF MAGNESIUM: CPT

## 2021-02-20 PROCEDURE — 70496 CT ANGIOGRAPHY HEAD: CPT

## 2021-02-20 PROCEDURE — 99284 EMERGENCY DEPT VISIT MOD MDM: CPT

## 2021-02-20 PROCEDURE — 85610 PROTHROMBIN TIME: CPT

## 2021-02-20 PROCEDURE — 93005 ELECTROCARDIOGRAM TRACING: CPT

## 2021-02-20 PROCEDURE — 71045 X-RAY EXAM CHEST 1 VIEW: CPT

## 2021-02-20 RX ORDER — SODIUM CHLORIDE 0.9 % (FLUSH) 0.9 %
5-40 SYRINGE (ML) INJECTION EVERY 8 HOURS
Status: DISCONTINUED | OUTPATIENT
Start: 2021-02-20 | End: 2021-02-21 | Stop reason: HOSPADM

## 2021-02-20 RX ORDER — DEXAMETHASONE SODIUM PHOSPHATE 10 MG/ML
10 INJECTION INTRAMUSCULAR; INTRAVENOUS ONCE
Status: COMPLETED | OUTPATIENT
Start: 2021-02-20 | End: 2021-02-20

## 2021-02-20 RX ORDER — ACETAMINOPHEN 650 MG/1
650 SUPPOSITORY RECTAL
Status: DISCONTINUED | OUTPATIENT
Start: 2021-02-20 | End: 2021-02-21 | Stop reason: HOSPADM

## 2021-02-20 RX ORDER — SODIUM CHLORIDE 0.9 % (FLUSH) 0.9 %
5-40 SYRINGE (ML) INJECTION AS NEEDED
Status: DISCONTINUED | OUTPATIENT
Start: 2021-02-20 | End: 2021-02-21 | Stop reason: HOSPADM

## 2021-02-20 RX ORDER — SODIUM CHLORIDE 9 MG/ML
50 INJECTION, SOLUTION INTRAVENOUS CONTINUOUS
Status: DISCONTINUED | OUTPATIENT
Start: 2021-02-20 | End: 2021-02-21 | Stop reason: HOSPADM

## 2021-02-20 RX ORDER — KETOROLAC TROMETHAMINE 30 MG/ML
30 INJECTION, SOLUTION INTRAMUSCULAR; INTRAVENOUS
Status: COMPLETED | OUTPATIENT
Start: 2021-02-20 | End: 2021-02-20

## 2021-02-20 RX ORDER — ASPIRIN 325 MG
325 TABLET ORAL DAILY
Status: DISCONTINUED | OUTPATIENT
Start: 2021-02-21 | End: 2021-02-21 | Stop reason: HOSPADM

## 2021-02-20 RX ORDER — ACETAMINOPHEN 325 MG/1
650 TABLET ORAL
Status: DISCONTINUED | OUTPATIENT
Start: 2021-02-20 | End: 2021-02-21 | Stop reason: HOSPADM

## 2021-02-20 RX ADMIN — DEXAMETHASONE SODIUM PHOSPHATE 10 MG: 10 INJECTION INTRAMUSCULAR; INTRAVENOUS at 21:43

## 2021-02-20 RX ADMIN — IOPAMIDOL 100 ML: 755 INJECTION, SOLUTION INTRAVENOUS at 22:52

## 2021-02-20 RX ADMIN — KETOROLAC TROMETHAMINE 30 MG: 30 INJECTION, SOLUTION INTRAMUSCULAR at 21:42

## 2021-02-20 NOTE — LETTER
[facility] [address] [place] MED/SURG 
[address] [facility] [address] [place] [address] 
[phone] Work/School Note Date: 2/20/2021 To Whom It May concern: 
 
Iris Villar was seen and treated today in the hospital by the following provider(s): 
Attending Provider: Glenna Beatty MD.   
 
Iris Villar is excused from work/school on 2/21/2021 through 2/24/2021. She is medically clear to return to work/school on 2/25/2021.   
  
 
Sincerely, 
 
 
 
 
Hayder Cardona MD

## 2021-02-20 NOTE — LETTER
ADULT WORK/SCHOOL NOTE Kady Carney [address] [facility] [address] 
 
 
02/21/21 To whom it may concern, Please be advised that Kady Carney was hospitalized on 2/20 and discharged from the hospital on 02/21/21. She was attended closely by her wife whose  absence should be consider excused Sincerely, 
 
 
 
 
Maribel Meyers MD

## 2021-02-21 VITALS
HEIGHT: 69 IN | WEIGHT: 245 LBS | HEART RATE: 91 BPM | OXYGEN SATURATION: 100 % | SYSTOLIC BLOOD PRESSURE: 135 MMHG | BODY MASS INDEX: 36.29 KG/M2 | TEMPERATURE: 97.9 F | DIASTOLIC BLOOD PRESSURE: 86 MMHG | RESPIRATION RATE: 18 BRPM

## 2021-02-21 LAB
ANION GAP SERPL CALC-SCNC: 9 MMOL/L (ref 5–15)
ATRIAL RATE: 83 BPM
BUN SERPL-MCNC: 10 MG/DL (ref 6–20)
BUN/CREAT SERPL: 10 (ref 12–20)
CA-I BLD-MCNC: 8.4 MG/DL (ref 8.5–10.1)
CALCULATED P AXIS, ECG09: 26 DEGREES
CALCULATED R AXIS, ECG10: -13 DEGREES
CALCULATED T AXIS, ECG11: 26 DEGREES
CHLORIDE SERPL-SCNC: 109 MMOL/L (ref 97–108)
CHOLEST SERPL-MCNC: 165 MG/DL
CO2 SERPL-SCNC: 22 MMOL/L (ref 21–32)
CREAT SERPL-MCNC: 1 MG/DL (ref 0.55–1.02)
CRP SERPL HS-MCNC: 2.8 MG/L
DIAGNOSIS, 93000: NORMAL
EST. AVERAGE GLUCOSE BLD GHB EST-MCNC: 108 MG/DL
GLUCOSE SERPL-MCNC: 210 MG/DL (ref 65–100)
HBA1C MFR BLD: 5.4 % (ref 4–5.6)
HDLC SERPL-MCNC: 54 MG/DL
HDLC SERPL: 3.1 {RATIO} (ref 0–5)
LDLC SERPL CALC-MCNC: 82.6 MG/DL (ref 0–100)
LIPID PROFILE,FLP: NORMAL
P-R INTERVAL, ECG05: 160 MS
POTASSIUM SERPL-SCNC: 3.6 MMOL/L (ref 3.5–5.1)
Q-T INTERVAL, ECG07: 378 MS
QRS DURATION, ECG06: 70 MS
QTC CALCULATION (BEZET), ECG08: 444 MS
SODIUM SERPL-SCNC: 140 MMOL/L (ref 136–145)
TRIGL SERPL-MCNC: 142 MG/DL (ref ?–150)
TROPONIN I SERPL-MCNC: <0.05 NG/ML
VENTRICULAR RATE, ECG03: 83 BPM
VLDLC SERPL CALC-MCNC: 28.4 MG/DL

## 2021-02-21 PROCEDURE — 92610 EVALUATE SWALLOWING FUNCTION: CPT

## 2021-02-21 PROCEDURE — 74011250637 HC RX REV CODE- 250/637: Performed by: HOSPITALIST

## 2021-02-21 PROCEDURE — 99218 HC RM OBSERVATION: CPT

## 2021-02-21 PROCEDURE — 74011250636 HC RX REV CODE- 250/636: Performed by: HOSPITALIST

## 2021-02-21 PROCEDURE — 83036 HEMOGLOBIN GLYCOSYLATED A1C: CPT

## 2021-02-21 PROCEDURE — 84484 ASSAY OF TROPONIN QUANT: CPT

## 2021-02-21 PROCEDURE — 36415 COLL VENOUS BLD VENIPUNCTURE: CPT

## 2021-02-21 PROCEDURE — 86141 C-REACTIVE PROTEIN HS: CPT

## 2021-02-21 PROCEDURE — 80061 LIPID PANEL: CPT

## 2021-02-21 PROCEDURE — 80048 BASIC METABOLIC PNL TOTAL CA: CPT

## 2021-02-21 RX ORDER — ATORVASTATIN CALCIUM 40 MG/1
40 TABLET, FILM COATED ORAL DAILY
Status: DISCONTINUED | OUTPATIENT
Start: 2021-02-21 | End: 2021-02-21 | Stop reason: HOSPADM

## 2021-02-21 RX ORDER — IBUPROFEN 200 MG
1 TABLET ORAL DAILY
Status: DISCONTINUED | OUTPATIENT
Start: 2021-02-21 | End: 2021-02-21 | Stop reason: HOSPADM

## 2021-02-21 RX ORDER — HYDROCHLOROTHIAZIDE 25 MG/1
25 TABLET ORAL DAILY
Status: DISCONTINUED | OUTPATIENT
Start: 2021-02-21 | End: 2021-02-21 | Stop reason: HOSPADM

## 2021-02-21 RX ORDER — AMLODIPINE BESYLATE 5 MG/1
10 TABLET ORAL DAILY
Status: DISCONTINUED | OUTPATIENT
Start: 2021-02-21 | End: 2021-02-21 | Stop reason: HOSPADM

## 2021-02-21 RX ADMIN — HYDROCHLOROTHIAZIDE 25 MG: 25 TABLET ORAL at 09:24

## 2021-02-21 RX ADMIN — SODIUM CHLORIDE 50 ML/HR: 9 INJECTION, SOLUTION INTRAVENOUS at 00:10

## 2021-02-21 RX ADMIN — Medication 10 ML: at 01:14

## 2021-02-21 RX ADMIN — ASPIRIN 325 MG ORAL TABLET 325 MG: 325 PILL ORAL at 09:24

## 2021-02-21 RX ADMIN — AMLODIPINE BESYLATE 10 MG: 5 TABLET ORAL at 09:24

## 2021-02-21 RX ADMIN — APIXABAN 5 MG: 2.5 TABLET, FILM COATED ORAL at 09:24

## 2021-02-21 RX ADMIN — Medication 10 ML: at 06:35

## 2021-02-21 NOTE — H&P
History and Physical              Subjective :   Chief Complaint : Headache, right-sided weakness with history of CVA    Source of information : Patient    History of present illness:   54 y.o. female with a history of migraine headaches started after she had a CVA with no deficits in 2017, hypertension, bipolar disorder presents to the emergency room complaining of headache on the right side of the head. States it started this morning, it was on her right side in the frontal area, it is sharp pain, waxing and waning in severity. She tried to take rest with no improvement. Denies any photophobia or phonophobia. Denies any nausea or vomiting. No blurred vision or double vision. In the evening she started feeling number on her right side and weak. Mostly in the upper extremity. Did not have any loss of consciousness, no gait disturbance. She actually walked into the emergency room. Was evaluated by specialist on call for neurology and recommended to have an MRI and transcranial Doppler done. She is already on Eliquis due to history of CVA in the past.    Past Medical History:   Diagnosis Date    Asthma     [facility]     Hypertension     Migraine     Stroke [facility])     2017 august hemmorragic stroke    TIA (transient ischemic attack)      History reviewed. No pertinent surgical history. History reviewed. No pertinent family history. Social History     Tobacco Use    Smoking status: Current Every Day Smoker     Packs/day: 0.50    Smokeless tobacco: Never Used   Substance Use Topics    Alcohol use: No       Prior to Admission medications    Medication Sig Start Date End Date Taking? Authorizing Provider   apixaban (Eliquis) 5 mg tablet Take 5 mg by mouth two (2) times a day. Other, MD Enrique   ondansetron (Zofran ODT) 4 mg disintegrating tablet 1 Tab by SubLINGual route every six (6) hours as needed for Nausea or Vomiting.  1/16/21   Lesly Griffin MD   potassium chloride SA (MICRO-K) 10 mEq capsule Take 1 Cap by mouth two (2) times a day. 1/16/21   Lorena Merchant MD   atorvastatin (LIPITOR) 10 mg tablet Take 10 mg by mouth daily. Enrique Campos MD   haloperidol lactate (HALDOL) 5 mg/mL injection 5 mg by IntraMUSCular route once. Enrique Campos MD   amLODIPine (NORVASC) 5 mg tablet Take 5 mg by mouth daily. Enrique Campos MD   hydrochlorothiazide (HYDRODIURIL) 25 mg tablet Take 25 mg by mouth daily. Enrique Campos MD     Allergies   Allergen Reactions    Imitrex [Sumatriptan Succinate] Anaphylaxis    Fioricet [Butalbital-Acetaminophen-Caff] Nausea and Vomiting    Tramadol Nausea and Vomiting             Review of Systems:  Constitutional: Appetite is good, denies weight loss, no fever, no chills, no night sweats. Eye: No recent visual disturbances, no discharge, no double vision. Ear/nose/mouth/throat : No hearing disturbance, no ear pain, no nasal congestion, no sore throat, no trouble swallowing. Respiratory : No trouble breathing, no cough, no shortness of breath, no hemoptysis, no wheezing. Cardiovascular : No chest pain, no palpitation, no racing of heart, no orthopnea, no paroxysmal nocturnal dyspnea, no peripheral edema. Gastrointestinal : No nausea, no vomiting, no diarrhea, No constipation, No heartburn, No abdominal pain. Genitourinary : No dysuria, no hematuria, no increased frequency, No incontinence. Lymphatics : No swollen glands -Neck, axillary, inguinal.  Endocrine : No excessive thirst, No polyuria No cold intolerance, No heat intolerance. Immunologic : No hives, urticaria, No seasonal allergies. Musculoskeletal : No joint swelling, No pain, No effusion,  No back pain, No neck pain. Integumentary : No rash, No pruritus, No ecchymosis. Hematology : No petechiae, No easy bruising,  No tendency to bleed easy. Neurology : Denies change in mental status, No abnormal balance,  No confusion.   Psychiatric : No mood swings, No anxiety, No depression. Vitals:     Patient Vitals for the past 12 hrs:   Temp Pulse Resp BP SpO2   02/20/21 2314 -- 72 17 114/71 100 %   02/20/21 2100 98.5 °F (36.9 °C) 96 17 (!) 110/90 100 %       Physical Exam:   General : Looks comfortable, no respiratory distress noted. HEENT : PERRLA, normal oral mucosa, atraumatic normocephalic, Normal ear and nose. Neck : Supple, no JVD, no masses noted, no carotid bruit. Lungs : Breath sounds with good air entry bilaterally, no wheezes or rales, no accessory muscle use. CVS : Rhythm rate regular, S1+, S2+, no murmur or gallop. Abdomen : Soft, nontender, no organomegaly, bowel sounds active. Extremities : No edema noted,  pedal pulses palpable. Musculoskeletal : Fair range of motion, no joint swelling or effusion, muscle tone and power appears normal.   Skin : Moist, warm,  no pathological rash. Lymphatic : No cervical lymphadenopathy. Neurological : Awake, alert, oriented to time place person. Cranial nerves intact, deep tendon reflexes active, no sensory disturbance, no cerebellar deficits. Psychiatric : Mood and affect appears appropriate to the situation. Data Review:   Recent Results (from the past 24 hour(s))   CBC WITH AUTOMATED DIFF    Collection Time: 02/20/21  9:05 PM   Result Value Ref Range    WBC 8.4 3.6 - 11.0 K/uL    RBC 4.19 3.80 - 5.20 M/uL    HGB 13.1 11.5 - 16.0 g/dL    HCT 38.7 35.0 - 47.0 %    MCV 92.4 80.0 - 99.0 FL    MCH 31.3 26.0 - 34.0 PG    MCHC 33.9 30.0 - 36.5 g/dL    RDW 13.6 11.5 - 14.5 %    PLATELET 622 287 - 001 K/uL    MPV 10.4 8.9 - 12.9 FL    NEUTROPHILS 53 32 - 75 %    LYMPHOCYTES 38 12 - 49 %    MONOCYTES 5 5 - 13 %    EOSINOPHILS 4 0 - 7 %    BASOPHILS 0 0 - 1 %    IMMATURE GRANULOCYTES 0 0.0 - 0.5 %    ABS. NEUTROPHILS 4.5 1.8 - 8.0 K/UL    ABS. LYMPHOCYTES 3.2 0.8 - 3.5 K/UL    ABS. MONOCYTES 0.5 0.0 - 1.0 K/UL    ABS. EOSINOPHILS 0.3 0.0 - 0.4 K/UL    ABS. BASOPHILS 0.0 0.0 - 0.1 K/UL    ABS. IMM.  GRANS. 0.0 0.00 - 0.04 K/UL DF AUTOMATED     METABOLIC PANEL, COMPREHENSIVE    Collection Time: 02/20/21  9:05 PM   Result Value Ref Range    Sodium 140 136 - 145 mmol/L    Potassium 3.6 3.5 - 5.1 mmol/L    Chloride 110 (H) 97 - 108 mmol/L    CO2 25 21 - 32 mmol/L    Anion gap 5 5 - 15 mmol/L    Glucose 92 65 - 100 mg/dL    BUN 5 (L) 6 - 20 mg/dL    Creatinine 0.80 0.55 - 1.02 mg/dL    BUN/Creatinine ratio 6 (L) 12 - 20      GFR est AA >60 >60 ml/min/1.73m2    GFR est non-AA >60 >60 ml/min/1.73m2    Calcium 8.4 (L) 8.5 - 10.1 mg/dL    Bilirubin, total 0.4 0.2 - 1.0 mg/dL    AST (SGOT) 11 (L) 15 - 37 U/L    ALT (SGPT) 16 12 - 78 U/L    Alk.  phosphatase 89 45 - 117 U/L    Protein, total 7.1 6.4 - 8.2 g/dL    Albumin 3.3 (L) 3.5 - 5.0 g/dL    Globulin 3.8 2.0 - 4.0 g/dL    A-G Ratio 0.9 (L) 1.1 - 2.2     TROPONIN I    Collection Time: 02/20/21  9:05 PM   Result Value Ref Range    Troponin-I, Qt. <0.05 <0.05 ng/mL   BNP    Collection Time: 02/20/21  9:05 PM   Result Value Ref Range    NT pro-BNP 80 <125 pg/mL   TSH 3RD GENERATION    Collection Time: 02/20/21  9:05 PM   Result Value Ref Range    TSH 2.48 0.36 - 3.74 uIU/mL   MAGNESIUM    Collection Time: 02/20/21  9:05 PM   Result Value Ref Range    Magnesium 2.0 1.6 - 2.4 mg/dL   PROTHROMBIN TIME + INR    Collection Time: 02/20/21  9:53 PM   Result Value Ref Range    Prothrombin time 14.0 11.9 - 14.7 sec    INR 1.1 0.9 - 1.1     PTT    Collection Time: 02/20/21  9:53 PM   Result Value Ref Range    aPTT 30.7 23.0 - 35.7 sec    aPTT, therapeutic range   68 - 109 sec   SARS-COV-2    Collection Time: 02/20/21 10:37 PM   Result Value Ref Range    SARS-CoV-2 Please find results under separate order     COVID-19 RAPID TEST    Collection Time: 02/20/21 10:37 PM   Result Value Ref Range    Specimen source Nasopharyngeal      COVID-19 rapid test Not Detected Not Detected         Radiologic Studies :   CT Results  (Last 48 hours)               02/20/21 9305  CTA CODE NEURO HEAD AND NECK W CONT Final result    Impression:  No hemodynamically significant abnormality by NASCET criteria. HEAD CTA       Axial images through the brain with multiplanar reconstruction and generation of   MIP images following IV contrast.       RIGHT   Visualized portions of the internal carotid artery are normal.   Bifurcation is intact. Middle cerebral artery and its branches are intact. Anterior cerebral artery and its branches are intact. LEFT   Visualized portions of the internal carotid artery are normal.   Bifurcation is intact. Middle cerebral artery and its branches are intact. Anterior cerebral artery and its branches are intact. POSTERIOR   Visualized portions of the vertebral arteries are intact. Visualized cerebellar branches are intact. Basilar artery is normal.   Basilar bifurcation is intact. Both posterior cerebral arteries and their branches are intact. Visualized major dural venous sinuses opacify normally with no evidence of   thrombosis. No abnormal areas of brain parenchymal enhancement are appreciated. IMPRESSION:Normal intracranial CTA. Narrative:  PROCEDURE: CTA CODE NEURO HEAD AND NECK W CONT       HISTORY:Possible stroke       COMPARISON:Unenhanced head CT earlier this evening       Department policy stipulates all CT scans at this facility are performed using   dose reduction optimization techniques as appropriate to the performed exam,   including the following: Automated exposure control, adjustments of the mA   and/or KVP according to the patient size, and the use of iterative   reconstruction technique. CTA OF THE NECK       TECHNIQUE: Pre and postcontrast CT angiogram of the neck performed and maximum   intensity projection images (MIPS) generated.          AORTIC ARCH: Visualized aortic arch normal   CAROTID ARTERIES: Normal   VERTEBRAL ARTERIES: Normal   OTHER ARTERIES: Other visualized arteries appear unremarkable VENOUS STRUCTURES: Visualized venous structures appear unremarkable   OSSEOUS STRUCTURES and SOFT TISSUES: Normal   OTHER: None           02/20/21 2056  CT CODE NEURO HEAD WO CONTRAST Final result    Impression:      Normal noncontrast head CT. No evidence of acute intracranial pathology by   noncontrast CT evaluation. Results of this exam were called to and discussed with  Dr. Esteban Shanks on 2/20/2021   9:12 PM.       Narrative:  CT CODE NEURO HEAD WO CONTRAST     2/20/2021 8:56 PM; SRM         Indication: 54years old;  Female; stroke;       Technique: Noncontrast CT of the head was obtained according to standard   protocol. Comparison: None       Dose reduction: All CT scans at this facility are performed using dose reduction   optimization techniques as appropriate to the performed exam including the   following: Automatic exposure control; adjustment of the mA and/or kV according   to patient size; or the use of reconstruction techniques. Findings:       The ventricles, cisterns, and cortical sulcal pattern are within normal limits   of size and configuration for age. Gray-white differentiation is maintained. There is no hemorrhage, mass, edema, hydrocephalus, or midline shift. Midline   sagittal anatomic morphology is normal.        The native lenses are absent. The imaged paranasal sinuses are well-aerated. The   mastoid air cells are well-aerated. The calvarium is intact. No osseous   abnormalities are evident. CXR Results  (Last 48 hours)               02/20/21 2125  XR CHEST SNGL V Final result    Impression:  No acute cardiopulmonary disease identified. Arrhythmia detector   device now present. Narrative:  History is CVA. Comparison is with the chest x-ray of 9/5/2018. An AP portable upright view of the chest demonstrates cardiac monitor leads. There is an arrhythmia detection device overlying the left cardiac silhouette.    The heart is not enlarged. There is no pneumonia, pneumothorax, effusion or   pulmonary edema. The osseous structures appear within normal limits. Assessment and Plan :     Right frontal headache associated with right-sided weakness: Already evaluated by specialist on call for neurology, we will request a in-house neurology consultation and follow-up recommendations. Suspected migraine headache with hemiplegia: As per recommendations we will order transcranial Doppler. Benign essential hypertension: We will continue home medications    History of CVA on follow-up with the neurology at [facility]. On Eliquis for suspected atrial fibrillation causing CVA. States that she is on a reveal implant for monitoring for arrhythmias. Chronic smoker: Ordered for NicoDerm patch    Admitted to medical telemetry for observation, full CODE STATUS, home medications verified with the patient. CC : Erlin Astorga MD  Signed By: Dioni Laird MD     February 20, 2021      This dictation was done by dragon, computer voice recognition software. Often unanticipated grammatical, syntax, [place] phones and other interpretive errors are inadvertently transcribed. Please excuse errors that have escaped final proofreading.

## 2021-02-21 NOTE — DISCHARGE SUMMARY
Physician Discharge Summary     Patient ID:    Echo Salinas  [id number]  89 y.o.  1975    Admit date: 2/20/2021    Discharge date : 2/21/2021    Chronic Diagnoses:    Problem List as of 2/21/2021 Date Reviewed: 2/20/2021          Codes Class Noted - Resolved    Right arm weakness ICD-10-CM: R29.898  ICD-9-CM: 729.89  2/20/2021 - Present        TIA (transient ischemic attack) ICD-10-CM: G45.9  ICD-9-CM: 435.9  2/20/2021 - Present          22    Final Diagnoses:   TIA (transient ischemic attack) [G45.9] vs more likely complicated migraine  Possible paroxysmal atrial fibrillation  Migraine headaches  History of CVA      Reason for Hospitalization:  54 y.o. female with a history of migraine headaches started after she had a CVA with no deficits in 2017, hypertension, bipolar disorder presents to the emergency room complaining of headache on the right side of the head. States it started this morning, it was on her right side in the frontal area, it is sharp pain, waxing and waning in severity. She tried to take rest with no improvement. Denies any photophobia or phonophobia. Denies any nausea or vomiting. No blurred vision or double vision. In the evening she started feeling number on her right side and weak. Mostly in the upper extremity. Did not have any loss of consciousness, no gait disturbance. She actually walked into the emergency room. Was evaluated by specialist on call for neurology and recommended to have an MRI and transcranial Doppler done. She is already on Eliquis due to history of CVA in the past.    Hospital Course:   Patient was admitted overnight, continued on her home medications. She had no neurologic symptoms. Right-sided paresthesias improved    She was seen by neurology in the next day who recommended no further work-up.   She will follow up with her outpatient neurologist              Discharge Medications:   Current Discharge Medication List CONTINUE these medications which have NOT CHANGED    Details   apixaban (Eliquis) 5 mg tablet Take 5 mg by mouth two (2) times a day. atorvastatin (LIPITOR) 10 mg tablet Take 40 mg by mouth daily. amLODIPine (Norvasc) 10 mg tablet Take 10 mg by mouth daily. hydrochlorothiazide (HYDRODIURIL) 25 mg tablet Take 25 mg by mouth daily. Follow up Care:    1. Tess Cameron MD in 1-2 weeks. Please call to set up an appointment shortly after discharge. Diet:  Cardiac Diet    Disposition:  Home. Advanced Directive:   FULL    DNR      Discharge Exam:  General:  Alert, cooperative, no distress, appears stated age. Lungs:   Clear to auscultation bilaterally. Chest wall:  No tenderness or deformity. Heart:  Regular rate and rhythm, S1, S2 normal, no murmur, click, rub or gallop. Abdomen:   Soft, non-tender. Bowel sounds normal. No masses,  No organomegaly. Extremities: Extremities normal, atraumatic, no cyanosis or edema. Pulses: 2+ and symmetric all extremities. Skin: Skin color, texture, turgor normal. No rashes or lesions   Neurologic: CNII-XII intact. No gross sensory or motor deficits        CONSULTATIONS: Neurology    Significant Diagnostic Studies:   2/20/2021: BUN 5 mg/dL* (Ref range: 6 - 20 mg/dL); Calcium 8.4 mg/dL* (Ref range: 8.5 - 10.1 mg/dL); CO2 25 mmol/L (Ref range: 21 - 32 mmol/L); Creatinine 0.80 mg/dL (Ref range: 0.55 - 1.02 mg/dL); Glucose 92 mg/dL (Ref range: 65 - 100 mg/dL); HCT 38.7 % (Ref range: 35.0 - 47.0 %); HGB 13.1 g/dL (Ref range: 11.5 - 16.0 g/dL); Potassium 3.6 mmol/L (Ref range: 3.5 - 5.1 mmol/L);  Sodium 140 mmol/L (Ref range: 136 - 145 mmol/L)  Recent Labs     02/20/21  2105   WBC 8.4   HGB 13.1   HCT 38.7        Recent Labs     02/20/21  2105      K 3.6   *   CO2 25   BUN 5*   CREA 0.80   GLU 92   CA 8.4*   MG 2.0     Recent Labs     02/20/21  2105   ALT 16   AP 89   TBILI 0.4   TP 7.1   ALB 3.3*   GLOB 3.8     Recent Labs     02/20/21 2153   INR 1.1   PTP 14.0   APTT 30.7      No results for input(s): FE, TIBC, PSAT, FERR in the last 72 hours. No results for input(s): PH, PCO2, PO2 in the last 72 hours. No results for input(s): CPK, CKMB in the last 72 hours.     No lab exists for component: TROPONINI  No results found for: [facility]    Discharge time spent 35 minutes    Signed:  Joseph Holland MD  2/21/2021  9:31 AM

## 2021-02-21 NOTE — PROGRESS NOTES
Skin assessment done with Ander Quintero (RN). Patient's skin is unremarkable without bruises, abrasions or open areas.

## 2021-02-21 NOTE — ED TRIAGE NOTES
Headaches all day migraine not uncommon for pt and then 15 mins ago having right sided arm numbness. Pt told ems that this is consistent with previous strokes (2) pt ambulated into ed and brought to ct.

## 2021-02-21 NOTE — CONSULTS
Neurology Consult Note    HPI  Ms. Michael Stanton is a 54 y.o.  female with a history significant for Stroke, Possible Afib (apixaban), Migraines, HTN, Asthma, Bipolar Disorder who presented with right sided headache. Per chart review, patient's headache reportedly started on the morning of presentation and is on the right side. The pain is of a sharp nature with waxing and waning severity. She had no associated photophobia or phonophobia or any double vision or blurred vision. She did not have any associated nausea or vomiting. Later on in the day, patient started having numbness and weakness on the right side of her body. Patient reports that her symptoms are much improved this morning. She reports that she has had similar episodes in the past and follows with Dr. Maria Luisa Han at [address] [place] [address] for her headaches/migraines and episodic neurologic symptoms. She reports she has been taking her apixaban atorvastatin and not missing any doses. Patient feels that she is now ready to go home. She reports that she cannot have an MRI due to her loop recording device which she reports is not compatible. Home Stroke Prophylaxis: Apixaban 5 BID, Atorvastatin 10        Stroke Workup     CTH WO  NAICA      CTA Head/Neck  No occlusions, dissections, significant stenosis, pseudoaneurysms or aneurysms in the neurovascular system      IMPRESSION  Ms. Michael Stanton is a 54 y.o.  female with the above history presented with right-sided headache that began on the morning of presentation. The pain was described as a sharp pain with waxing and waning without associated photophobia, phonophobia or any nausea/vomiting. Patient had no visual difficulties with it. In the evening of presentation, patient developed right sided weakness and numbness. Emergent CT head and CTA head/neck without any acute findings. Etiology of patient's symptoms most likely result of complex migraine.   Since patient reports she is unable to RX for Tramadol faxed to the pharmacy.  MEME Singh      get MRI due to her current loop recording device, no further inpatient neurologic work-up will be necessary as her symptoms have now returned to baseline and she is able to ambulate and her migraine is under control. Patient will follow up with her personal neurologist at [facility] in 2 weeks from discharge. RECOMMENDATIONS      Right-Sided Headache with Right Hemibody Weakness/Numbness, Likely Complex Migraine  Reported History of Strokes  -SBP Goal < 160  -Stroke Prophylaxis: Apixaban 5 BID, Atorvastatin 10  -PT/OT/ST as needed    No further inpatient neurologic work-up is necessary  . Patient follow-up with personal neurologist at [facility] in 2 weeks from discharge      Diagnosis and plan was discussed extensively with patient who is in agreement with the above plan. They have been counseled regarding potential side effects of the interventions above and would like to proceed with the aforementioned plan. Review of Systems  A 14 point review was done and pertinent positives & negative findings are listed as part of the history of present illness, all other systems were reviewed and are negative. Physical/Neurological Exam  General: [employer] -[employer] female  Cardiovascular: normal rate  Pulmonary: No increased work of breathing  Gastrointestinal/Abdomen: soft w/hypoactive bowel sounds   Skin: warm and dry      Patient awake, alert; following central and peripheral commands   No expressive or receptive aphasia;  No dysarthria   Pupils react to light bilaterally; EOM Intact   No visual field deficits on gross exam   Unable to visualize optic disc had a retinal vessels on funduscopic examination   Intact to light touch on face bilaterally   No facial droop   No gross hearing loss   Tongue is midline   Motor: 4+/5 Throughout   FTN intact bilaterally   No abnormal movements   Normal tone throughout   Sensation to light touch intact grossly throughout   Toes equivocal bilaterally    NIHSS: 0      Past Medical History:   Diagnosis Date    Asthma     Bipolar 1 disorder ([facility] [address]     Hypertension     Migraine     Stroke [facility])     2017 august hemmorragic stroke    TIA (transient ischemic attack)       History reviewed. No pertinent surgical history. Allergies   Allergen Reactions    Imitrex [Sumatriptan Succinate] Anaphylaxis    Fioricet [Butalbital-Acetaminophen-Caff] Nausea and Vomiting    Tramadol Nausea and Vomiting     History reviewed. No pertinent family history.   Relationships   Social connections    Talks on phone: Not on file    Gets together: Not on file    Attends Ca[employer] service: Not on file    Active member of club or organization: Not on file    Attends meetings of clubs or organizations: Not on file    Relationship status: Not on file         Medications  Current Outpatient Medications   Medication Instructions    amLODIPine (NORVASC) 10 mg, Oral, DAILY    apixaban (ELIQUIS) 5 mg, Oral, 2 TIMES DAILY    atorvastatin (LIPITOR) 40 mg, Oral, DAILY    hydroCHLOROthiazide (HYDRODIURIL) 25 mg, DAILY       Current Facility-Administered Medications   Medication Dose Route Frequency    nicotine (NICODERM CQ) 21 mg/24 hr patch 1 Patch  1 Patch TransDERmal DAILY    amLODIPine (NORVASC) tablet 10 mg  10 mg Oral DAILY    apixaban (ELIQUIS) tablet 5 mg  5 mg Oral BID    atorvastatin (LIPITOR) tablet 40 mg  40 mg Oral DAILY    hydroCHLOROthiazide (HYDRODIURIL) tablet 25 mg  25 mg Oral DAILY    acetaminophen (TYLENOL) tablet 650 mg  650 mg Oral Q4H PRN    Or    acetaminophen (TYLENOL) solution 650 mg  650 mg Per NG tube Q4H PRN    Or    acetaminophen (TYLENOL) suppository 650 mg  650 mg Rectal Q4H PRN    0.9% sodium chloride infusion  50 mL/hr IntraVENous CONTINUOUS    sodium chloride (NS) flush 5-40 mL  5-40 mL IntraVENous Q8H    sodium chloride (NS) flush 5-40 mL  5-40 mL IntraVENous PRN    aspirin tablet 325 mg  325 mg Oral DAILY        Objective  Temp:  [98 °F (36.7 °C)-98.5 °F (36.9 °C)] Pulse (Heart Rate):  [70-96]   BP: (110-121)/(71-90)   Resp Rate:  [17-18]   O2 Sat (%):  [98 %-100 %]   Weight:  [109.8 kg (242 lb)-111.1 kg (245 lb)]   No intake or output data in the 24 hours ending 02/21/21 0658  Wt Readings from Last 3 Encounters:   02/21/21 111.1 kg (245 lb)   01/16/21 102.1 kg (225 lb)   12/15/17 111.1 kg (245 lb)        Labs  Recent Results (from the past 24 hour(s))   CBC WITH AUTOMATED DIFF    Collection Time: 02/20/21  9:05 PM   Result Value Ref Range    WBC 8.4 3.6 - 11.0 K/uL    RBC 4.19 3.80 - 5.20 M/uL    HGB 13.1 11.5 - 16.0 g/dL    HCT 38.7 35.0 - 47.0 %    MCV 92.4 80.0 - 99.0 FL    MCH 31.3 26.0 - 34.0 PG    MCHC 33.9 30.0 - 36.5 g/dL    RDW 13.6 11.5 - 14.5 %    PLATELET 551 442 - 367 K/uL    MPV 10.4 8.9 - 12.9 FL    NEUTROPHILS 53 32 - 75 %    LYMPHOCYTES 38 12 - 49 %    MONOCYTES 5 5 - 13 %    EOSINOPHILS 4 0 - 7 %    BASOPHILS 0 0 - 1 %    IMMATURE GRANULOCYTES 0 0.0 - 0.5 %    ABS. NEUTROPHILS 4.5 1.8 - 8.0 K/UL    ABS. LYMPHOCYTES 3.2 0.8 - 3.5 K/UL    ABS. MONOCYTES 0.5 0.0 - 1.0 K/UL    ABS. EOSINOPHILS 0.3 0.0 - 0.4 K/UL    ABS. BASOPHILS 0.0 0.0 - 0.1 K/UL    ABS. IMM. GRANS. 0.0 0.00 - 0.04 K/UL    DF AUTOMATED     METABOLIC PANEL, COMPREHENSIVE    Collection Time: 02/20/21  9:05 PM   Result Value Ref Range    Sodium 140 136 - 145 mmol/L    Potassium 3.6 3.5 - 5.1 mmol/L    Chloride 110 (H) 97 - 108 mmol/L    CO2 25 21 - 32 mmol/L    Anion gap 5 5 - 15 mmol/L    Glucose 92 65 - 100 mg/dL    BUN 5 (L) 6 - 20 mg/dL    Creatinine 0.80 0.55 - 1.02 mg/dL    BUN/Creatinine ratio 6 (L) 12 - 20      GFR est AA >60 >60 ml/min/1.73m2    GFR est non-AA >60 >60 ml/min/1.73m2    Calcium 8.4 (L) 8.5 - 10.1 mg/dL    Bilirubin, total 0.4 0.2 - 1.0 mg/dL    AST (SGOT) 11 (L) 15 - 37 U/L    ALT (SGPT) 16 12 - 78 U/L    Alk.  phosphatase 89 45 - 117 U/L    Protein, total 7.1 6.4 - 8.2 g/dL    Albumin 3.3 (L) 3.5 - 5.0 g/dL    Globulin 3.8 2.0 - 4.0 g/dL    A-G Ratio 0.9 (L) 1.1 - 2.2 TROPONIN I    Collection Time: 02/20/21  9:05 PM   Result Value Ref Range    Troponin-I, Qt. <0.05 <0.05 ng/mL   BNP    Collection Time: 02/20/21  9:05 PM   Result Value Ref Range    NT pro-BNP 80 <125 pg/mL   TSH 3RD GENERATION    Collection Time: 02/20/21  9:05 PM   Result Value Ref Range    TSH 2.48 0.36 - 3.74 uIU/mL   MAGNESIUM    Collection Time: 02/20/21  9:05 PM   Result Value Ref Range    Magnesium 2.0 1.6 - 2.4 mg/dL   PROTHROMBIN TIME + INR    Collection Time: 02/20/21  9:53 PM   Result Value Ref Range    Prothrombin time 14.0 11.9 - 14.7 sec    INR 1.1 0.9 - 1.1     PTT    Collection Time: 02/20/21  9:53 PM   Result Value Ref Range    aPTT 30.7 23.0 - 35.7 sec    aPTT, therapeutic range   68 - 109 sec   SARS-COV-2    Collection Time: 02/20/21 10:37 PM   Result Value Ref Range    SARS-CoV-2 Please find results under separate order     COVID-19 RAPID TEST    Collection Time: 02/20/21 10:37 PM   Result Value Ref Range    Specimen source Nasopharyngeal      COVID-19 rapid test Not Detected Not Detected            Significant Diagnostic Studies  All images independently visualized    CTA CODE NEURO HEAD AND NECK W CONT   Final Result   No hemodynamically significant abnormality by NASCET criteria. HEAD CTA      Axial images through the brain with multiplanar reconstruction and generation of   MIP images following IV contrast.      RIGHT   Visualized portions of the internal carotid artery are normal.   Bifurcation is intact. Middle cerebral artery and its branches are intact. Anterior cerebral artery and its branches are intact. LEFT   Visualized portions of the internal carotid artery are normal.   Bifurcation is intact. Middle cerebral artery and its branches are intact. Anterior cerebral artery and its branches are intact. POSTERIOR   Visualized portions of the vertebral arteries are intact. Visualized cerebellar branches are intact.    Basilar artery is normal. Basilar bifurcation is intact. Both posterior cerebral arteries and their branches are intact. Visualized major dural venous sinuses opacify normally with no evidence of   thrombosis. No abnormal areas of brain parenchymal enhancement are appreciated. IMPRESSION:Normal intracranial CTA. XR CHEST SNGL V   Final Result   No acute cardiopulmonary disease identified. Arrhythmia detector   device now present. CT CODE NEURO HEAD WO CONTRAST   Final Result      Normal noncontrast head CT. No evidence of acute intracranial pathology by   noncontrast CT evaluation. Results of this exam were called to and discussed with  Dr. Keli Wheeler on 2/20/2021   9:12 PM.            This document has been prepared by the Dragon voice recognition system, typographical errors may have occurred.  Attempts have been made to correct errors, however inadvertent errors may persist.

## 2021-02-21 NOTE — PROGRESS NOTES
SPEECH LANGUAGE PATHOLOGY BEDSIDE SWALLOW EVALUATIONS  Patient: Rose Plascencia  (89 y.o. )  Date: 2/21/2021  Primary Diagnosis: R-arm Weakness  Precautions: Fall     ASSESSMENT :  Pt seen upright, alert and cooperative. Pt denies hx of dysphagia. Pt agreeable to po trials of thin liquids and hard solids. No overt s/sx of aspiration. Oral phase intact. Upon digital palpation: pt's swallow initiation appears timely and HLE appears intact. Patient will benefit from skilled intervention to address the above impairments. Patients rehabilitation potential is considered to be Excellent     PLAN :  Recommendations and Planned Interventions:  Continue thin/regular diet. STRICT aspiration precautions advised. No further ST intervention medically necessary. Discharge from [address] [place] [address] and reconsult if medically warranted. Frequency/Duration: Patient will be followed by speech-language pathology 1 time a week to address goals. Discharge Recommendations: None     SUBJECTIVE:   Patient reports \"I don't have any swallowing issues. \"    OBJECTIVE:     Past Medical History:   Diagnosis Date    Asthma     Bipolar 1 disorder ([facility] [address]     Hypertension     Migraine     Stroke [facility])     2017 august hemmorragic stroke    TIA (transient ischemic attack)        CXR Results  (Last 48 hours)                 02/20/21 2125  XR CHEST SNGL V Final result    Impression:  No acute cardiopulmonary disease identified. Arrhythmia detector   device now present. Narrative:  History is CVA. Comparison is with the chest x-ray of 9/5/2018. An AP portable upright view of the chest demonstrates cardiac monitor leads. There is an arrhythmia detection device overlying the left cardiac silhouette. The heart is not enlarged. There is no pneumonia, pneumothorax, effusion or   pulmonary edema. The osseous structures appear within normal limits.                  Current Diet:  DIET CARDIAC     Cognitive and Communication Status:  Neurologic State: Alert  Orientation Level: Oriented X4  Cognition: Appropriate decision making, Follows commands  Swallowing Evaluation:   Oral Assessment:  Oral Assessment  Labial: No impairment  Dentition: Intact  Lingual: No impairment  P.O. Trials:  Patient Position: Upright  Vocal quality prior to P.O.: No impairment  Consistency Presented: Thin liquid; Solid  Bolus Acceptance: No impairment  Bolus Formation/Control: No impairment  Propulsion: No impairment  Oral Residue: None  Initiation of Swallow: No impairment  Laryngeal Elevation: Functional  Aspiration Signs/Symptoms: None  Pharyngeal Phase Characteristics: No impairment, issues, or problems   After treatment:   Patient left in no apparent distress in bed    COMMUNICATION/EDUCATION:   Patient was educated regarding safe swallow strategies and aspiration precautions. The patient's plan of care including recommendations, planned interventions, and recommended diet changes were discussed with: Registered nurse and Physician. Patient understands intent and goals of therapy, but is neutral about his/her participation. Thank you for this referral.  Segundo Myers M.S., CCC-SLP  Time Calculation: 8 mins     Problem: Dysphagia (Adult)  Goal: *Acute Goals and Plan of Care (Insert Text)  Description: Speech Therapy Swallow Goals  Initiated 2/21/2021  -Patient will tolerate Thin/Regular without clinical indicators of aspiration given no cues within 1 day(s). [ ] Not met  [x]  MET   [ ] Progressing  [ ] Discontinue  -Patient will demonstrate understanding of swallow safety precautions and aspiration precautions, diet recs with no cues within 1 day(s).         [ ] Not met  [x]  MET   [ ] Progressing  [ ] Discontinue      Outcome: Resolved/Met     Problem: Patient Education: Go to Patient Education Activity  Goal: Patient/Family Education  Outcome: Resolved/Met

## 2021-02-21 NOTE — ED PROVIDER NOTES
EMERGENCY DEPARTMENT HISTORY AND PHYSICAL EXAM      Date: 2/20/2021  Patient Name: Vinod Hays    History of Presenting Illness     Chief Complaint   Patient presents with    Extremity Weakness       History Provided By: Patient    HPI: Vinod Hays, 54 y.o. female with a past medical history significant stroke presents to the ED with chief complaint of Extremity Weakness  . 67-year-old female notes she had multiple strokes in the past.  Does take Eliquis twice a day last took it at 6:30 PM.  Correne Alberto to bed with a severe headache. Woke up with facial droop and numbness and weakness on the right side. Presents immediately when she woke up per family. No falls or trauma. Symptoms are persistent. There are no other complaints, changes, or physical findings at this time. PCP: Dirk Nayak MD    Current Outpatient Medications   Medication Sig Dispense Refill    apixaban (Eliquis) 5 mg tablet Take 5 mg by mouth two (2) times a day.  ondansetron (Zofran ODT) 4 mg disintegrating tablet 1 Tab by SubLINGual route every six (6) hours as needed for Nausea or Vomiting. 8 Tab 0    potassium chloride SA (MICRO-K) 10 mEq capsule Take 1 Cap by mouth two (2) times a day. 10 Cap 0    atorvastatin (LIPITOR) 10 mg tablet Take 10 mg by mouth daily.  haloperidol lactate (HALDOL) 5 mg/mL injection 5 mg by IntraMUSCular route once.  amLODIPine (NORVASC) 5 mg tablet Take 5 mg by mouth daily.  hydrochlorothiazide (HYDRODIURIL) 25 mg tablet Take 25 mg by mouth daily. Past History     Past Medical History:  Past Medical History:   Diagnosis Date    Asthma     Bipolar 1 disorder ([facility] [address]     Hypertension     Migraine     Stroke [facility])     2017 august hemmorragic stroke    TIA (transient ischemic attack)        Past Surgical History:  History reviewed. No pertinent surgical history. Family History:  History reviewed. No pertinent family history.     Social History:  Social History     Tobacco Use    Smoking status: Current Every Day Smoker     Packs/day: 0.50    Smokeless tobacco: Never Used   Substance Use Topics    Alcohol use: No    Drug use: No       Allergies: Allergies   Allergen Reactions    Imitrex [Sumatriptan Succinate] Anaphylaxis    Fioricet [Butalbital-Acetaminophen-Caff] Nausea and Vomiting    Tramadol Nausea and Vomiting         Review of Systems   Review of Systems   Constitutional: Negative. Negative for chills, fatigue and fever. HENT: Negative. Negative for congestion, nosebleeds and sore throat. Eyes: Negative. Negative for pain, discharge and visual disturbance. Respiratory: Negative. Negative for cough, chest tightness and shortness of breath. Cardiovascular: Negative for chest pain, palpitations and leg swelling. Gastrointestinal: Negative for abdominal pain, blood in stool, constipation, diarrhea, nausea and vomiting. Endocrine: Negative. Genitourinary: Negative. Negative for difficulty urinating, dysuria, pelvic pain and vaginal bleeding. Musculoskeletal: Negative. Negative for arthralgias, back pain and myalgias. Skin: Negative. Negative for rash and wound. Allergic/Immunologic: Negative. Neurological: Positive for weakness and headaches. Negative for dizziness, syncope and numbness. Hematological: Negative. Psychiatric/Behavioral: Negative. Negative for agitation, confusion and suicidal ideas. All other systems reviewed and are negative. Physical Exam   Physical Exam  Vitals signs and nursing note reviewed. Exam conducted with a chaperone present. Constitutional:       Appearance: Normal appearance. She is normal weight. HENT:      Head: Normocephalic and atraumatic. Nose: Nose normal.      Mouth/Throat:      Mouth: Mucous membranes are moist.      Pharynx: Oropharynx is clear. Eyes:      Extraocular Movements: Extraocular movements intact.       Conjunctiva/sclera: Conjunctivae normal. Pupils: Pupils are equal, round, and reactive to light.   Neck:      Musculoskeletal: Normal range of motion and neck supple.   Cardiovascular:      Rate and Rhythm: Normal rate and regular rhythm.      Pulses: Normal pulses.      Heart sounds: Normal heart sounds.   Pulmonary:      Effort: Pulmonary effort is normal. No respiratory distress.      Breath sounds: Normal breath sounds.   Abdominal:      General: Abdomen is flat. Bowel sounds are normal. There is no distension.      Palpations: Abdomen is soft.      Tenderness: There is no abdominal tenderness. There is no guarding.   Musculoskeletal: Normal range of motion.         General: No swelling, tenderness, deformity or signs of injury.      Right lower leg: No edema.      Left lower leg: No edema.   Skin:     General: Skin is warm and dry.      Capillary Refill: Capillary refill takes less than 2 seconds.      Findings: No lesion or rash.   Neurological:      General: No focal deficit present.      Mental Status: She is alert and oriented to person, place, and time. Mental status is at baseline.      Cranial Nerves: No cranial nerve deficit.      Comments: R sided weakneess   Psychiatric:         Mood and Affect: Mood normal.         Behavior: Behavior normal.         Thought Content: Thought content normal.         Judgment: Judgment normal.         Diagnostic Study Results     Labs -     Recent Results (from the past 12 hour(s))   CBC WITH AUTOMATED DIFF    Collection Time: 02/20/21  9:05 PM   Result Value Ref Range    WBC 8.4 3.6 - 11.0 K/uL    RBC 4.19 3.80 - 5.20 M/uL    HGB 13.1 11.5 - 16.0 g/dL    HCT 38.7 35.0 - 47.0 %    MCV 92.4 80.0 - 99.0 FL    MCH 31.3 26.0 - 34.0 PG    MCHC 33.9 30.0 - 36.5 g/dL    RDW 13.6 11.5 - 14.5 %    PLATELET 278 150 - 400 K/uL    MPV 10.4 8.9 - 12.9 FL    NEUTROPHILS 53 32 - 75 %    LYMPHOCYTES 38 12 - 49 %    MONOCYTES 5 5 - 13 %    EOSINOPHILS 4 0 - 7 %    BASOPHILS 0 0 - 1 %    IMMATURE GRANULOCYTES 0 0.0 - 0.5 %     ABS. NEUTROPHILS 4.5 1.8 - 8.0 K/UL    ABS. LYMPHOCYTES 3.2 0.8 - 3.5 K/UL    ABS. MONOCYTES 0.5 0.0 - 1.0 K/UL    ABS. EOSINOPHILS 0.3 0.0 - 0.4 K/UL    ABS. BASOPHILS 0.0 0.0 - 0.1 K/UL    ABS. IMM. GRANS. 0.0 0.00 - 0.04 K/UL    DF AUTOMATED         Radiologic Studies -   XR CHEST SNGL V   Final Result   No acute cardiopulmonary disease identified. Arrhythmia detector   device now present. CT CODE NEURO HEAD WO CONTRAST   Final Result      Normal noncontrast head CT. No evidence of acute intracranial pathology by   noncontrast CT evaluation. Results of this exam were called to and discussed with  Dr. Bradley Malloy on 2/20/2021   9:12 PM.      CTA CODE NEURO HEAD AND NECK W CONT    (Results Pending)     CT Results  (Last 48 hours)               02/20/21 2056  CT CODE NEURO HEAD WO CONTRAST Final result    Impression:      Normal noncontrast head CT. No evidence of acute intracranial pathology by   noncontrast CT evaluation. Results of this exam were called to and discussed with  Dr. Bradley Malloy on 2/20/2021   9:12 PM.       Narrative:  CT CODE NEURO HEAD WO CONTRAST     2/20/2021 8:56 PM; SRM         Indication: 54years old;  Female; stroke;       Technique: Noncontrast CT of the head was obtained according to standard   protocol. Comparison: None       Dose reduction: All CT scans at this facility are performed using dose reduction   optimization techniques as appropriate to the performed exam including the   following: Automatic exposure control; adjustment of the mA and/or kV according   to patient size; or the use of reconstruction techniques. Findings:       The ventricles, cisterns, and cortical sulcal pattern are within normal limits   of size and configuration for age. Gray-white differentiation is maintained. There is no hemorrhage, mass, edema, hydrocephalus, or midline shift. Midline   sagittal anatomic morphology is normal.        The native lenses are absent.  The imaged paranasal sinuses are well-aerated. The   mastoid air cells are well-aerated. The calvarium is intact. No osseous   abnormalities are evident. CXR Results  (Last 48 hours)               02/20/21 2125  XR CHEST SNGL V Final result    Impression:  No acute cardiopulmonary disease identified. Arrhythmia detector   device now present. Narrative:  History is CVA. Comparison is with the chest x-ray of 9/5/2018. An AP portable upright view of the chest demonstrates cardiac monitor leads. There is an arrhythmia detection device overlying the left cardiac silhouette. The heart is not enlarged. There is no pneumonia, pneumothorax, effusion or   pulmonary edema. The osseous structures appear within normal limits. Medical Decision Making and ED Course   I am the first provider for this patient. I reviewed the vital signs, available nursing notes, past medical history, past surgical history, family history and social history. Vital Signs-Reviewed the patient's vital signs. Patient Vitals for the past 12 hrs:   Temp Pulse Resp BP SpO2   02/20/21 2100 98.5 °F (36.9 °C) 96 17 (!) 110/90 100 %       EKG interpretation:         Records Reviewed: Previous Hospital chart. EMS run report      ED Course:   Initial assessment performed. The patients presenting problems have been discussed, and they are in agreement with the care plan formulated and outlined with them. I have encouraged them to ask questions as they arise throughout their visit. Orders Placed This Encounter    CT CODE NEURO HEAD WO CONTRAST     Standing Status:   Standing     Number of Occurrences:   1     Order Specific Question:   Transport     Answer:   BED [2]     Order Specific Question:   Is Patient Pregnant?      Answer:   No     Order Specific Question:   Reason for Exam     Answer:   stroke     Order Specific Question:   Decision Support Exception     Answer:   Emergency Medical Condition (MA) [1]    XR CHEST SNGL V     Standing Status:   Standing     Number of Occurrences:   1     Order Specific Question:   Transport     Answer:   BED [2]     Order Specific Question:   Reason for Exam     Answer:   cva     Order Specific Question:   Is Patient Pregnant? Answer:   No    CTA CODE NEURO HEAD AND NECK W CONT     Standing Status:   Standing     Number of Occurrences:   1     Order Specific Question:   Transport     Answer:   BED [2]     Order Specific Question:   Is Patient Pregnant? Answer:   No     Order Specific Question:   Reason for Exam     Answer:   cva     Order Specific Question:   Does patient have history of Renal Disease? Answer:   No    CBC WITH AUTOMATED DIFF     Standing Status:   Standing     Number of Occurrences:   1    METABOLIC PANEL, COMPREHENSIVE     Standing Status:   Standing     Number of Occurrences:   1    TROPONIN I     Standing Status:   Standing     Number of Occurrences:   1    BNP     Standing Status:   Standing     Number of Occurrences:   1    URINALYSIS W/ REFLEX CULTURE     Standing Status:   Standing     Number of Occurrences:   1    PROCALCITONIN     Standing Status:   Standing     Number of Occurrences:   1    TSH 3RD GENERATION     Standing Status:   Standing     Number of Occurrences:   1    MAGNESIUM     Standing Status:   Standing     Number of Occurrences:   1    SARS-COV-2     Standing Status:   Standing     Number of Occurrences:   1     Order Specific Question:   Specimen source     Answer:   NASOPHARYNGEAL SWAB [650]     Order Specific Question:   Is this test for diagnosis or screening? Answer:   Diagnosis of ill patient     Order Specific Question:   Symptomatic for COVID-19 as defined by CDC? Answer:   Yes     Order Specific Question:   Date of Symptom Onset     Answer:   2/20/2021     Order Specific Question:   Hospitalized for COVID-19? Answer:   Yes     Order Specific Question:   Admitted to ICU for COVID-19?      Answer:   Unknown Order Specific Question:   Employed in healthcare setting? Answer:   No     Order Specific Question:   Resident in a congregate (group) care setting? Answer:   No     Order Specific Question:   Previously tested for COVID-19? Answer:   No     Order Specific Question:   Pregnant? Answer:   No    PROTHROMBIN TIME + INR     Standing Status:   Standing     Number of Occurrences:   1    PTT     Standing Status:   Standing     Number of Occurrences:   1    EKG, 12 LEAD, INITIAL     Standing Status:   Standing     Number of Occurrences:   1     Order Specific Question:   Reason for Exam:     Answer:   weakness    dexamethasone (PF) (DECADRON) 10 mg/mL injection 10 mg    ketorolac (TORADOL) injection 30 mg              CONSULTANTS:  Consults  Discussed the case with neurology. Patient is not a TPA candidate. This is secondary to subjective symptoms versus patient being on blood thinner and last took it at 6:30 PM tonight. Patient cannot receive an MRI secondary to cardiac device recommend CT a head, Dopplers, this also could be a possible complex migraine headache causing her symptoms. jessee admit    Provider Notes (Medical Decision Making):   58-year-old who has a stroke alert. History of strokes. Not a TPA candidate secondary to taking Eliquis and NIH scale by the neurologist.  Seen by telemetry neurology. Recommends admission for possible complex migraine versus CTA. Patient cannot get an MRI secondary to cardiac device.       Procedures           CRITICAL CARE NOTE :  9:53 PM  Amount of Critical Care Time: 55 minutes    IMPENDING DETERIORATION -Airway, Respiratory and CNS  ASSOCIATED RISK FACTORS - CNS Decompensation  MANAGEMENT- Bedside Assessment and Supervision of Care  INTERPRETATION -  Xrays and CT Scan  INTERVENTIONS - hemodynamic mngmt  CASE REVIEW - Hospitalist/Intensivist  TREATMENT RESPONSE -Improved  PERFORMED BY - Self    NOTES   :  I have spent critical care time involved in lab review, consultations with specialist, family decision- making, bedside attention and documentation. This time excludes time spent in any separate billed procedures. During this entire length of time I was immediately available to the patient . Korin Nazario MD                Disposition       Emergency Department Disposition:  admit      Diagnosis     Clinical Impression: R sided weakness r/o cva  Attestations:    Korin Nazario MD    Please note that this dictation was completed with [employer], the computer voice recognition software. Quite often unanticipated grammatical, syntax, homophones, and other interpretive errors are inadvertently transcribed by the computer software. Please disregard these errors. Please excuse any errors that have escaped final proofreading. Thank you.

## 2021-02-21 NOTE — PROGRESS NOTES
Tele and IVs discontinued-tips intact. Patient alert and oriented x 4. Discharge instructions explained and given to patient-verbalized understanding. Patient declined wheelchair ride down to private vehicle-patient ambulated off unit with steady gait.

## 2021-04-27 ENCOUNTER — HOSPITAL ENCOUNTER (EMERGENCY)
Age: 46
Discharge: HOME | End: 2021-04-27
Attending: EMERGENCY MEDICINE
Payer: MEDICARE

## 2021-04-27 VITALS
DIASTOLIC BLOOD PRESSURE: 82 MMHG | OXYGEN SATURATION: 99 % | WEIGHT: 230 LBS | SYSTOLIC BLOOD PRESSURE: 123 MMHG | BODY MASS INDEX: 34.07 KG/M2 | HEIGHT: 69 IN | RESPIRATION RATE: 14 BRPM | TEMPERATURE: 99.1 F | HEART RATE: 98 BPM

## 2021-04-27 DIAGNOSIS — Z88.9 HISTORY OF SEASONAL ALLERGIES: ICD-10-CM

## 2021-04-27 DIAGNOSIS — R21 RASH AND OTHER NONSPECIFIC SKIN ERUPTION: Primary | ICD-10-CM

## 2021-04-27 DIAGNOSIS — L50.9 URTICARIA: ICD-10-CM

## 2021-04-27 PROCEDURE — 74011636637 HC RX REV CODE- 636/637: Performed by: EMERGENCY MEDICINE

## 2021-04-27 PROCEDURE — 74011250637 HC RX REV CODE- 250/637: Performed by: EMERGENCY MEDICINE

## 2021-04-27 PROCEDURE — 99283 EMERGENCY DEPT VISIT LOW MDM: CPT

## 2021-04-27 PROCEDURE — A9270 NON-COVERED ITEM OR SERVICE: HCPCS | Performed by: EMERGENCY MEDICINE

## 2021-04-27 RX ORDER — FAMOTIDINE 20 MG/1
20 TABLET, FILM COATED ORAL 2 TIMES DAILY
Qty: 6 TAB | Refills: 0 | Status: SHIPPED | OUTPATIENT
Start: 2021-04-27 | End: 2021-05-03

## 2021-04-27 RX ORDER — METHYLPREDNISOLONE 4 MG/1
TABLET ORAL
Qty: 1 DOSE PACK | Refills: 0 | Status: SHIPPED | OUTPATIENT
Start: 2021-04-27

## 2021-04-27 RX ORDER — PREDNISONE 20 MG/1
60 TABLET ORAL
Status: COMPLETED | OUTPATIENT
Start: 2021-04-27 | End: 2021-04-27

## 2021-04-27 RX ORDER — DIPHENHYDRAMINE HCL 25 MG
25 CAPSULE ORAL
Status: COMPLETED | OUTPATIENT
Start: 2021-04-27 | End: 2021-04-27

## 2021-04-27 RX ORDER — FAMOTIDINE 20 MG/1
20 TABLET, FILM COATED ORAL
Status: COMPLETED | OUTPATIENT
Start: 2021-04-27 | End: 2021-04-27

## 2021-04-27 RX ADMIN — PREDNISONE 60 MG: 20 TABLET ORAL at 16:56

## 2021-04-27 RX ADMIN — DIPHENHYDRAMINE HYDROCHLORIDE 25 MG: 25 CAPSULE ORAL at 16:56

## 2021-04-27 RX ADMIN — FAMOTIDINE 20 MG: 20 TABLET, FILM COATED ORAL at 16:56

## 2021-04-27 NOTE — ED PROVIDER NOTES
EMERGENCY DEPARTMENT HISTORY AND PHYSICAL EXAM      Date: 4/27/2021  Patient Name: Reji Ashraf    History of Presenting Illness     No chief complaint on file. History Provided By: Patient    HPI: Reji Ashraf, 54 y.o. female with a past medical history significant hypertension, stroke and asthma presents to the ED with cc of rash. Patient states that she typically has a similar rash in the spring. She has noticed papular rash over upper extremities anterior chest neck, along with some nasal congestion and watery eyes and sometimes scratchy throat. She specifically denies tongue swelling, lip swelling, eyelid swelling, difficulty swallowing, hoarseness or stridor or trouble breathing. Patient took Zyrtec and Benadryl off-and-on over the past 4 days to try to dampen the effects of the rash. However she notes is extremely itchy and is having difficulty not scratching. Last dose of Benadryl was last night. There are no other complaints, changes, or physical findings at this time. PCP: Ronn Chacko MD    No current facility-administered medications on file prior to encounter. Current Outpatient Medications on File Prior to Encounter   Medication Sig Dispense Refill    apixaban (Eliquis) 5 mg tablet Take 5 mg by mouth two (2) times a day.  atorvastatin (LIPITOR) 10 mg tablet Take 40 mg by mouth daily.  amLODIPine (Norvasc) 10 mg tablet Take 10 mg by mouth daily.  hydrochlorothiazide (HYDRODIURIL) 25 mg tablet Take 25 mg by mouth daily. Past History     Past Medical History:  Past Medical History:   Diagnosis Date    Asthma     Bipolar 1 disorder ([facility] [address]     Environmental and seasonal allergies     Hypertension     Migraine     Stroke [facility])     2017 august hemmorragic stroke    TIA (transient ischemic attack)        Past Surgical History:  No pertinent surgical history. Family History:  History reviewed. No pertinent family history.     Social History:  Social History     Tobacco Use    Smoking status: Current Every Day Smoker     Packs/day: 0.50    Smokeless tobacco: Never Used   Substance Use Topics    Alcohol use: No    Drug use: No       Allergies: Allergies   Allergen Reactions    Imitrex [Sumatriptan Succinate] Anaphylaxis    Fioricet [Butalbital-Acetaminophen-Caff] Nausea and Vomiting    Tramadol Nausea and Vomiting         Review of Systems     Review of Systems   Constitutional: Negative. HENT: Positive for congestion and rhinorrhea. Eyes: Positive for itching. Respiratory: Negative for chest tightness, shortness of breath, wheezing and stridor. Cardiovascular: Negative. Gastrointestinal: Negative. Genitourinary: Negative. Musculoskeletal: Negative. Skin: Positive for rash. Neurological: Negative. All other systems reviewed and are negative. Physical Exam     Physical Exam  Vitals signs and nursing note reviewed. Constitutional:       General: She is not in acute distress. Appearance: Normal appearance. HENT:      Head: Normocephalic. Nose: Congestion present. Mouth/Throat:      Mouth: Mucous membranes are moist.   Eyes:      Conjunctiva/sclera: Conjunctivae normal.   Neck:      Musculoskeletal: Normal range of motion. Cardiovascular:      Rate and Rhythm: Normal rate and regular rhythm. Pulmonary:      Effort: Pulmonary effort is normal.      Breath sounds: Normal breath sounds. Abdominal:      General: Abdomen is flat. Musculoskeletal: Normal range of motion. Skin:     General: Skin is warm. Findings: Rash present. Comments: Scattered papular rash over bilateral upper extremities, anterior chest up neck with areas of confluence of rash   Neurological:      Mental Status: She is alert and oriented to person, place, and time. Lab and Diagnostic Study Results     Labs -NA    Medical Decision Making   - I am the first provider for this patient.     - I reviewed the vital signs, available nursing notes, past medical history, past surgical history, family history and social history. - Initial assessment performed. The patients presenting problems have been discussed, and they are in agreement with the care plan formulated and outlined with them. I have encouraged them to ask questions as they arise throughout their visit. Vital Signs-Reviewed the patient's vital signs. Patient Vitals for the past 12 hrs:   Temp Pulse Resp BP SpO2   04/27/21 1630 99.1 °F (37.3 °C) 98 14 123/82 99 %       Records Reviewed: Nursing Notes    The patient presents with rash with a differential diagnosis of seasonal allergies, contact dermatitis, skin eruption,. ED Course:          Provider Notes (Medical Decision Making):   MDM  Number of Diagnoses or Management Options  Diagnosis management comments: Patient has a known history of seasonal allergies with similar rash however did not do any prophylactic management to avoid. Since arrival has tried to treat with Zyrtec and Benadryl but states itching is too intense and has had to do additional therapy in the past.  Patient has no airway compromise. Procedures   Medical Decision Makingedical Decision Making  The patient was counseled on the dangers of tobacco use, and was advised to quit and referred to a tobacco cessation program.  Reviewed strategies to maximize success, including removing cigarettes and smoking materials from environment, stress management, substitution of other forms of reinforcement, support of family/friends and written materials. Disposition   Disposition: Condition stable        DISCHARGE PLAN:  1. Current Discharge Medication List      CONTINUE these medications which have NOT CHANGED    Details   apixaban (Eliquis) 5 mg tablet Take 5 mg by mouth two (2) times a day. atorvastatin (LIPITOR) 10 mg tablet Take 40 mg by mouth daily.       amLODIPine (Norvasc) 10 mg tablet Take 10 mg by mouth daily.      hydrochlorothiazide (HYDRODIURIL) 25 mg tablet Take 25 mg by mouth daily. 2.   Follow-up Information    None       3. Return to ED if worse   4. Current Discharge Medication List            Diagnosis     Clinical Impression: No diagnosis found. Attestations:    Marilee Castillo MD    Please note that this dictation was completed with [employer], the computer voice recognition software. Quite often unanticipated grammatical, syntax, homophones, and other interpretive errors are inadvertently transcribed by the computer software. Please disregard these errors. Please excuse any errors that have escaped final proofreading. Thank you.

## 2021-04-27 NOTE — LETTER
[address] [place] [address] 
[phone] Work/School Note Date: 4/27/2021 To Whom It May concern: 
 
 
Mark Fong was seen and treated today in the emergency room by the following provider(s): 
Attending Provider: Bobo Whitman MD.   
 
Mark Fong is excused from work/school on 04/27/21. She is clear to return to work/school on 04/28/21. Sincerely, Quang Moreno MD

## 2021-04-27 NOTE — ED TRIAGE NOTES
Pt c/o pruritic rash to bilateral arms and R side of neck x4 days. States \"this happens every year with allergy season\".

## 2021-04-27 NOTE — DISCHARGE INSTRUCTIONS
Use Benadryl as directed at night to avoid scratching while asleep and during day as needed to help avoid scratching and decrease itching. Wear long sleeves. Keep nails trimmed.

## 2021-07-12 ENCOUNTER — HOSPITAL ENCOUNTER (EMERGENCY)
Age: 46
Discharge: LEFT BEFORE DISCHARGE | End: 2021-07-12
Payer: MEDICARE

## 2021-07-12 VITALS
OXYGEN SATURATION: 97 % | DIASTOLIC BLOOD PRESSURE: 85 MMHG | TEMPERATURE: 97.9 F | RESPIRATION RATE: 18 BRPM | HEIGHT: 69 IN | BODY MASS INDEX: 35.25 KG/M2 | HEART RATE: 86 BPM | WEIGHT: 238 LBS | SYSTOLIC BLOOD PRESSURE: 128 MMHG

## 2021-07-12 PROCEDURE — 75810000275 HC EMERGENCY DEPT VISIT NO LEVEL OF CARE

## 2021-07-12 NOTE — ED TRIAGE NOTES
Pt states she feels like she has bronchitis. Pt c/o cough and chest congestion. Sx began Saturday. Pt has been trying home remedies with little relief. Denies fever. Tested self at work for covid and was negative.

## 2021-08-02 ENCOUNTER — HOSPITAL ENCOUNTER (EMERGENCY)
Age: 46
Discharge: LEFT BEFORE DISCHARGE | End: 2021-08-02
Payer: MEDICARE

## 2021-08-02 VITALS
SYSTOLIC BLOOD PRESSURE: 121 MMHG | DIASTOLIC BLOOD PRESSURE: 77 MMHG | TEMPERATURE: 99.4 F | HEART RATE: 79 BPM | RESPIRATION RATE: 18 BRPM | BODY MASS INDEX: 36.58 KG/M2 | OXYGEN SATURATION: 99 % | WEIGHT: 247 LBS | HEIGHT: 69 IN

## 2021-08-02 PROCEDURE — 75810000275 HC EMERGENCY DEPT VISIT NO LEVEL OF CARE

## 2021-08-02 NOTE — ED TRIAGE NOTES
Patient states she has a herniated disc in her back, and she is having back pain; pt states she is having lower back pain with radiating pain down her right leg; denies any known injuries; denies urinary sx

## 2021-08-02 NOTE — ED NOTES
Pt found not to be in the room, noted to have left the unit but found in ED waiting area, triage nurse and charge nurse tried explaining to pt that provider was in another pt's room and was coming to see her shortly but refused insisting that she wanted to speak to nurse supervisor. Nurse supervisor called to talk to pt at this time.

## 2021-08-29 ENCOUNTER — HOSPITAL ENCOUNTER (EMERGENCY)
Age: 46
Discharge: LEFT BEFORE DISCHARGE | End: 2021-08-29
Payer: MEDICARE

## 2021-08-29 PROCEDURE — 75810000275 HC EMERGENCY DEPT VISIT NO LEVEL OF CARE

## 2021-11-25 ENCOUNTER — HOSPITAL ENCOUNTER (EMERGENCY)
Age: 46
Discharge: LEFT BEFORE DISCHARGE | End: 2021-11-25
Attending: FAMILY MEDICINE
Payer: MEDICARE

## 2021-11-25 ENCOUNTER — APPOINTMENT (OUTPATIENT)
Dept: GENERAL RADIOLOGY | Age: 46
End: 2021-11-25
Attending: FAMILY MEDICINE
Payer: MEDICARE

## 2021-11-25 VITALS
HEIGHT: 69 IN | RESPIRATION RATE: 16 BRPM | TEMPERATURE: 98.4 F | OXYGEN SATURATION: 99 % | HEART RATE: 90 BPM | WEIGHT: 245 LBS | BODY MASS INDEX: 36.29 KG/M2 | DIASTOLIC BLOOD PRESSURE: 81 MMHG | SYSTOLIC BLOOD PRESSURE: 119 MMHG

## 2021-11-25 PROCEDURE — 99281 EMR DPT VST MAYX REQ PHY/QHP: CPT

## 2021-11-25 PROCEDURE — 73140 X-RAY EXAM OF FINGER(S): CPT

## 2021-11-25 RX ORDER — ESTRADIOL 1 MG/1
1 TABLET ORAL DAILY
COMMUNITY

## 2022-03-19 PROBLEM — G45.9 TIA (TRANSIENT ISCHEMIC ATTACK): Status: ACTIVE | Noted: 2021-02-20

## 2022-03-19 PROBLEM — R29.898 RIGHT ARM WEAKNESS: Status: ACTIVE | Noted: 2021-02-20

## 2022-06-27 ENCOUNTER — HOSPITAL ENCOUNTER (EMERGENCY)
Age: 47
Discharge: LEFT BEFORE DISCHARGE | End: 2022-06-27
Attending: EMERGENCY MEDICINE
Payer: MEDICARE

## 2022-06-27 VITALS
OXYGEN SATURATION: 100 % | DIASTOLIC BLOOD PRESSURE: 97 MMHG | HEIGHT: 69 IN | TEMPERATURE: 98.1 F | WEIGHT: 254 LBS | SYSTOLIC BLOOD PRESSURE: 134 MMHG | BODY MASS INDEX: 37.62 KG/M2 | RESPIRATION RATE: 18 BRPM | HEART RATE: 100 BPM

## 2022-06-27 DIAGNOSIS — Z53.21 ELOPED FROM EMERGENCY DEPARTMENT: Primary | ICD-10-CM

## 2022-06-27 LAB
ALBUMIN SERPL-MCNC: 3.5 G/DL (ref 3.5–5)
ALBUMIN/GLOB SERPL: 0.9 {RATIO} (ref 1.1–2.2)
ALP SERPL-CCNC: 91 U/L (ref 45–117)
ALT SERPL-CCNC: 22 U/L (ref 12–78)
ANION GAP SERPL CALC-SCNC: 6 MMOL/L (ref 5–15)
AST SERPL W P-5'-P-CCNC: 20 U/L (ref 15–37)
BASOPHILS # BLD: 0 K/UL (ref 0–0.1)
BASOPHILS NFR BLD: 0 % (ref 0–1)
BILIRUB SERPL-MCNC: 0.6 MG/DL (ref 0.2–1)
BUN SERPL-MCNC: 10 MG/DL (ref 6–20)
BUN/CREAT SERPL: 12 (ref 12–20)
CA-I BLD-MCNC: 9.1 MG/DL (ref 8.5–10.1)
CHLORIDE SERPL-SCNC: 109 MMOL/L (ref 97–108)
CO2 SERPL-SCNC: 23 MMOL/L (ref 21–32)
CREAT SERPL-MCNC: 0.81 MG/DL (ref 0.55–1.02)
DIFFERENTIAL METHOD BLD: NORMAL
EOSINOPHIL # BLD: 0.1 K/UL (ref 0–0.4)
EOSINOPHIL NFR BLD: 1 % (ref 0–7)
ERYTHROCYTE [DISTWIDTH] IN BLOOD BY AUTOMATED COUNT: 12.3 % (ref 11.5–14.5)
GLOBULIN SER CALC-MCNC: 4.1 G/DL (ref 2–4)
GLUCOSE SERPL-MCNC: 107 MG/DL (ref 65–100)
HCT VFR BLD AUTO: 39.5 % (ref 35–47)
HGB BLD-MCNC: 14 G/DL (ref 11.5–16)
IMM GRANULOCYTES # BLD AUTO: 0 K/UL (ref 0–0.04)
IMM GRANULOCYTES NFR BLD AUTO: 0 % (ref 0–0.5)
LIPASE SERPL-CCNC: 74 U/L (ref 73–393)
LYMPHOCYTES # BLD: 2.2 K/UL (ref 0.8–3.5)
LYMPHOCYTES NFR BLD: 23 % (ref 12–49)
MCH RBC QN AUTO: 31.5 PG (ref 26–34)
MCHC RBC AUTO-ENTMCNC: 35.4 G/DL (ref 30–36.5)
MCV RBC AUTO: 89 FL (ref 80–99)
MONOCYTES # BLD: 0.5 K/UL (ref 0–1)
MONOCYTES NFR BLD: 5 % (ref 5–13)
NEUTS SEG # BLD: 6.8 K/UL (ref 1.8–8)
NEUTS SEG NFR BLD: 71 % (ref 32–75)
NRBC # BLD: 0 K/UL (ref 0–0.01)
NRBC BLD-RTO: 0 PER 100 WBC
PLATELET # BLD AUTO: 256 K/UL (ref 150–400)
PMV BLD AUTO: 10 FL (ref 8.9–12.9)
POTASSIUM SERPL-SCNC: 3.7 MMOL/L (ref 3.5–5.1)
PROT SERPL-MCNC: 7.6 G/DL (ref 6.4–8.2)
RBC # BLD AUTO: 4.44 M/UL (ref 3.8–5.2)
SODIUM SERPL-SCNC: 138 MMOL/L (ref 136–145)
WBC # BLD AUTO: 9.7 K/UL (ref 3.6–11)

## 2022-06-27 PROCEDURE — 80053 COMPREHEN METABOLIC PANEL: CPT

## 2022-06-27 PROCEDURE — 36415 COLL VENOUS BLD VENIPUNCTURE: CPT

## 2022-06-27 PROCEDURE — 85025 COMPLETE CBC W/AUTO DIFF WBC: CPT

## 2022-06-27 PROCEDURE — 75810000275 HC EMERGENCY DEPT VISIT NO LEVEL OF CARE

## 2022-06-27 PROCEDURE — 83690 ASSAY OF LIPASE: CPT

## 2022-08-22 ENCOUNTER — APPOINTMENT (OUTPATIENT)
Dept: GENERAL RADIOLOGY | Age: 47
End: 2022-08-22
Attending: STUDENT IN AN ORGANIZED HEALTH CARE EDUCATION/TRAINING PROGRAM
Payer: MEDICARE

## 2022-08-22 ENCOUNTER — HOSPITAL ENCOUNTER (EMERGENCY)
Age: 47
Discharge: HOME | End: 2022-08-23
Attending: STUDENT IN AN ORGANIZED HEALTH CARE EDUCATION/TRAINING PROGRAM | Admitting: STUDENT IN AN ORGANIZED HEALTH CARE EDUCATION/TRAINING PROGRAM
Payer: MEDICARE

## 2022-08-22 DIAGNOSIS — R07.9 CHEST PAIN, UNSPECIFIED TYPE: ICD-10-CM

## 2022-08-22 DIAGNOSIS — F41.9 ANXIETY: Primary | ICD-10-CM

## 2022-08-22 DIAGNOSIS — E87.6 HYPOKALEMIA: ICD-10-CM

## 2022-08-22 LAB
ANION GAP SERPL CALC-SCNC: 6 MMOL/L (ref 5–15)
BASOPHILS # BLD: 0.1 K/UL (ref 0–0.1)
BASOPHILS NFR BLD: 1 % (ref 0–1)
BUN SERPL-MCNC: 5 MG/DL (ref 6–20)
BUN/CREAT SERPL: 7 (ref 12–20)
CA-I BLD-MCNC: 8.9 MG/DL (ref 8.5–10.1)
CHLORIDE SERPL-SCNC: 103 MMOL/L (ref 97–108)
CO2 SERPL-SCNC: 30 MMOL/L (ref 21–32)
CREAT SERPL-MCNC: 0.74 MG/DL (ref 0.55–1.02)
DIFFERENTIAL METHOD BLD: NORMAL
EOSINOPHIL # BLD: 0.2 K/UL (ref 0–0.4)
EOSINOPHIL NFR BLD: 2 % (ref 0–7)
ERYTHROCYTE [DISTWIDTH] IN BLOOD BY AUTOMATED COUNT: 12.5 % (ref 11.5–14.5)
GLUCOSE SERPL-MCNC: 110 MG/DL (ref 65–100)
HCT VFR BLD AUTO: 37.9 % (ref 35–47)
HGB BLD-MCNC: 13.3 G/DL (ref 11.5–16)
IMM GRANULOCYTES # BLD AUTO: 0 K/UL (ref 0–0.04)
IMM GRANULOCYTES NFR BLD AUTO: 0 % (ref 0–0.5)
LYMPHOCYTES # BLD: 3 K/UL (ref 0.8–3.5)
LYMPHOCYTES NFR BLD: 33 % (ref 12–49)
MCH RBC QN AUTO: 30.9 PG (ref 26–34)
MCHC RBC AUTO-ENTMCNC: 35.1 G/DL (ref 30–36.5)
MCV RBC AUTO: 87.9 FL (ref 80–99)
MONOCYTES # BLD: 0.6 K/UL (ref 0–1)
MONOCYTES NFR BLD: 7 % (ref 5–13)
NEUTS SEG # BLD: 5.2 K/UL (ref 1.8–8)
NEUTS SEG NFR BLD: 57 % (ref 32–75)
NRBC # BLD: 0 K/UL (ref 0–0.01)
NRBC BLD-RTO: 0 PER 100 WBC
PLATELET # BLD AUTO: 268 K/UL (ref 150–400)
PMV BLD AUTO: 10.7 FL (ref 8.9–12.9)
POTASSIUM SERPL-SCNC: 3 MMOL/L (ref 3.5–5.1)
RBC # BLD AUTO: 4.31 M/UL (ref 3.8–5.2)
SODIUM SERPL-SCNC: 139 MMOL/L (ref 136–145)
TROPONIN-HIGH SENSITIVITY: 5 NG/L (ref 0–51)
WBC # BLD AUTO: 9 K/UL (ref 3.6–11)

## 2022-08-22 PROCEDURE — 74011250636 HC RX REV CODE- 250/636: Performed by: STUDENT IN AN ORGANIZED HEALTH CARE EDUCATION/TRAINING PROGRAM

## 2022-08-22 PROCEDURE — 85025 COMPLETE CBC W/AUTO DIFF WBC: CPT

## 2022-08-22 PROCEDURE — 93005 ELECTROCARDIOGRAM TRACING: CPT

## 2022-08-22 PROCEDURE — 84484 ASSAY OF TROPONIN QUANT: CPT

## 2022-08-22 PROCEDURE — 96367 TX/PROPH/DG ADDL SEQ IV INF: CPT

## 2022-08-22 PROCEDURE — 71045 X-RAY EXAM CHEST 1 VIEW: CPT

## 2022-08-22 PROCEDURE — 74011250637 HC RX REV CODE- 250/637: Performed by: STUDENT IN AN ORGANIZED HEALTH CARE EDUCATION/TRAINING PROGRAM

## 2022-08-22 PROCEDURE — 80048 BASIC METABOLIC PNL TOTAL CA: CPT

## 2022-08-22 PROCEDURE — 36415 COLL VENOUS BLD VENIPUNCTURE: CPT

## 2022-08-22 PROCEDURE — 99285 EMERGENCY DEPT VISIT HI MDM: CPT

## 2022-08-22 PROCEDURE — 96365 THER/PROPH/DIAG IV INF INIT: CPT

## 2022-08-22 RX ORDER — ALPRAZOLAM 0.5 MG/1
0.5 TABLET ORAL
Status: COMPLETED | OUTPATIENT
Start: 2022-08-22 | End: 2022-08-22

## 2022-08-22 RX ORDER — POTASSIUM CHLORIDE 20MEQ/15ML
20 LIQUID (ML) ORAL DAILY
Qty: 105 ML | Refills: 0 | Status: SHIPPED | OUTPATIENT
Start: 2022-08-22 | End: 2022-08-29

## 2022-08-22 RX ORDER — POTASSIUM CHLORIDE 7.45 MG/ML
10 INJECTION INTRAVENOUS
Status: DISPENSED | OUTPATIENT
Start: 2022-08-22 | End: 2022-08-23

## 2022-08-22 RX ORDER — MAGNESIUM SULFATE HEPTAHYDRATE 40 MG/ML
2 INJECTION, SOLUTION INTRAVENOUS
Status: COMPLETED | OUTPATIENT
Start: 2022-08-22 | End: 2022-08-22

## 2022-08-22 RX ORDER — POTASSIUM CHLORIDE 1.5 G/1.77G
40 POWDER, FOR SOLUTION ORAL
Status: COMPLETED | OUTPATIENT
Start: 2022-08-22 | End: 2022-08-22

## 2022-08-22 RX ADMIN — POTASSIUM CHLORIDE 10 MEQ: 7.46 INJECTION, SOLUTION INTRAVENOUS at 23:38

## 2022-08-22 RX ADMIN — ALPRAZOLAM 0.5 MG: 0.5 TABLET ORAL at 20:31

## 2022-08-22 RX ADMIN — MAGNESIUM SULFATE HEPTAHYDRATE 2 G: 40 INJECTION, SOLUTION INTRAVENOUS at 22:19

## 2022-08-22 RX ADMIN — SODIUM CHLORIDE 1000 ML: 9 INJECTION, SOLUTION INTRAVENOUS at 20:25

## 2022-08-22 RX ADMIN — POTASSIUM CHLORIDE 40 MEQ: 1.5 POWDER, FOR SOLUTION ORAL at 22:19

## 2022-08-22 RX ADMIN — POTASSIUM CHLORIDE 10 MEQ: 7.46 INJECTION, SOLUTION INTRAVENOUS at 22:19
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
## 2022-08-23 VITALS
DIASTOLIC BLOOD PRESSURE: 79 MMHG | SYSTOLIC BLOOD PRESSURE: 132 MMHG | TEMPERATURE: 98.7 F | OXYGEN SATURATION: 100 % | RESPIRATION RATE: 22 BRPM | HEIGHT: 69 IN | HEART RATE: 82 BPM | BODY MASS INDEX: 37.77 KG/M2 | WEIGHT: 255 LBS

## 2022-08-23 LAB
ATRIAL RATE: 111 BPM
CALCULATED P AXIS, ECG09: 44 DEGREES
CALCULATED R AXIS, ECG10: -33 DEGREES
CALCULATED T AXIS, ECG11: 23 DEGREES
DIAGNOSIS, 93000: NORMAL
P-R INTERVAL, ECG05: 164 MS
Q-T INTERVAL, ECG07: 342 MS
QRS DURATION, ECG06: 74 MS
QTC CALCULATION (BEZET), ECG08: 465 MS
VENTRICULAR RATE, ECG03: 111 BPM

## 2022-08-23 NOTE — BSMART NOTE
Patient provided with outpatient resources for therapy and psychiatry as requested by Dr. Nahun Torres

## 2022-08-23 NOTE — DISCHARGE INSTRUCTIONS
Thank you! Thank you for allowing me to care for you in the emergency department. It is my goal to provide you with excellent care. If you have not received excellent quality care, please ask to speak to the nurse manager. Please fill out the survey that will come to you by mail or email since we listen to your feedback! Below you will find a list of your tests from today's visit. Should you have any questions, please do not hesitate to call the emergency department. Labs  Recent Results (from the past 12 hour(s))   CBC WITH AUTOMATED DIFF    Collection Time: 08/22/22  8:21 PM   Result Value Ref Range    WBC 9.0 3.6 - 11.0 K/uL    RBC 4.31 3.80 - 5.20 M/uL    HGB 13.3 11.5 - 16.0 g/dL    HCT 37.9 35.0 - 47.0 %    MCV 87.9 80.0 - 99.0 FL    MCH 30.9 26.0 - 34.0 PG    MCHC 35.1 30.0 - 36.5 g/dL    RDW 12.5 11.5 - 14.5 %    PLATELET 926 631 - 536 K/uL    MPV 10.7 8.9 - 12.9 FL    NRBC 0.0 0.0  WBC    ABSOLUTE NRBC 0.00 0.00 - 0.01 K/uL    NEUTROPHILS 57 32 - 75 %    LYMPHOCYTES 33 12 - 49 %    MONOCYTES 7 5 - 13 %    EOSINOPHILS 2 0 - 7 %    BASOPHILS 1 0 - 1 %    IMMATURE GRANULOCYTES 0 0 - 0.5 %    ABS. NEUTROPHILS 5.2 1.8 - 8.0 K/UL    ABS. LYMPHOCYTES 3.0 0.8 - 3.5 K/UL    ABS. MONOCYTES 0.6 0.0 - 1.0 K/UL    ABS. EOSINOPHILS 0.2 0.0 - 0.4 K/UL    ABS. BASOPHILS 0.1 0.0 - 0.1 K/UL    ABS. IMM.  GRANS. 0.0 0.00 - 0.04 K/UL    DF AUTOMATED     METABOLIC PANEL, BASIC    Collection Time: 08/22/22  8:21 PM   Result Value Ref Range    Sodium 139 136 - 145 mmol/L    Potassium 3.0 (L) 3.5 - 5.1 mmol/L    Chloride 103 97 - 108 mmol/L    CO2 30 21 - 32 mmol/L    Anion gap 6 5 - 15 mmol/L    Glucose 110 (H) 65 - 100 mg/dL    BUN 5 (L) 6 - 20 mg/dL    Creatinine 0.74 0.55 - 1.02 mg/dL    BUN/Creatinine ratio 7 (L) 12 - 20      GFR est AA >60 >60 ml/min/1.73m2    GFR est non-AA >60 >60 ml/min/1.73m2    Calcium 8.9 8.5 - 10.1 mg/dL   TROPONIN-HIGH SENSITIVITY    Collection Time: 08/22/22  8:21 PM   Result Value Ref Range    Troponin-High Sensitivity 5 0 - 51 ng/L       Radiologic Studies  XR CHEST PORT   Final Result   Minimal bibasilar atelectasis. .  . CT Results  (Last 48 hours)      None          CXR Results  (Last 48 hours)                 08/22/22 2019  XR CHEST PORT Final result    Impression:  Minimal bibasilar atelectasis. .  . Narrative:  INDICATION:  CP        EXAM: Chest single view. COMPARISON: 2/20/2021. FINDINGS: A single frontal view of the chest at 2013 hours shows minimal   bibasilar atelectasis. .  The heart, mediastinum and pulmonary vasculature are   stable  event recorder projecting over the left ventricle is stable. .  The bony   thorax is unremarkable for age. .                 ------------------------------------------------------------------------------------------------------------  The exam and treatment you received in the Emergency Department were for an urgent problem and are not intended as complete care. It is important that you follow-up with a doctor, nurse practitioner, or physician assistant to:  (1) confirm your diagnosis,  (2) re-evaluation of changes in your illness and treatment, and  (3) for ongoing care. Please take your discharge instructions with you when you go to your follow-up appointment. If you have any problem arranging a follow-up appointment, contact the Emergency Department. If your symptoms become worse or you do not improve as expected and you are unable to reach your health care provider, please return to the Emergency Department. We are available 24 hours a day. If a prescription has been provided, please have it filled as soon as possible to prevent a delay in treatment. If you have any questions or reservations about taking the medication due to side effects or interactions with other medications, please call your primary care provider or contact the ER.

## 2022-12-08 ENCOUNTER — HOSPITAL ENCOUNTER (EMERGENCY)
Age: 47
Discharge: LEFT BEFORE DISCHARGE | End: 2022-12-08

## 2023-03-25 ENCOUNTER — HOSPITAL ENCOUNTER (EMERGENCY)
Age: 48
Discharge: HOME | End: 2023-03-25
Attending: EMERGENCY MEDICINE
Payer: MEDICARE

## 2023-03-25 VITALS
HEART RATE: 98 BPM | TEMPERATURE: 98.7 F | RESPIRATION RATE: 19 BRPM | DIASTOLIC BLOOD PRESSURE: 89 MMHG | HEIGHT: 69 IN | BODY MASS INDEX: 35.25 KG/M2 | SYSTOLIC BLOOD PRESSURE: 122 MMHG | OXYGEN SATURATION: 99 % | WEIGHT: 238 LBS

## 2023-03-25 DIAGNOSIS — F32.A DEPRESSION, UNSPECIFIED DEPRESSION TYPE: Primary | ICD-10-CM

## 2023-03-25 PROCEDURE — 99283 EMERGENCY DEPT VISIT LOW MDM: CPT

## 2023-03-25 PROCEDURE — 74011250637 HC RX REV CODE- 250/637

## 2023-03-25 RX ORDER — HYDROXYZINE PAMOATE 50 MG/1
50 CAPSULE ORAL
Status: COMPLETED | OUTPATIENT
Start: 2023-03-25 | End: 2023-03-25

## 2023-03-25 RX ORDER — HYDROXYZINE 50 MG/1
50 TABLET, FILM COATED ORAL
Qty: 20 TABLET | Refills: 0 | Status: SHIPPED | OUTPATIENT
Start: 2023-03-25 | End: 2023-04-04

## 2023-03-25 RX ADMIN — HYDROXYZINE PAMOATE 50 MG: 50 CAPSULE ORAL at 09:51

## 2023-03-25 NOTE — Clinical Note
[facility] EMERGENCY DEPT  [address] [place] [id number]  [phone]    Work/School Note    Date: 3/25/2023    To Whom It May concern:    Elie Pradhan was seen and treated today in the emergency room by the following provider(s):  Attending Provider: Emilee Melvin DO  Nurse Practitioner: Eric Benjamin NP. Elie Pradhan is excused from work/school on 3/25/2023 through 3/27/2023. She is medically clear to return to work/school on 3/28/2023.          Sincerely,          Wily Oliveira NP

## 2023-03-25 NOTE — BSMART NOTE
Comprehensive Assessment Form Part 1      Section I - Disposition    Adjustment Disorder with mixed moods   Grief issues       The Medical Doctor to Psychiatrist conference was not completed. The Medical Doctor is in agreement with Psychiatrist disposition because pt does not meet medical necessity for IP BH at this time. The plan is to discharge with resources. Recommendation to provide something for anxiety. The on-call Psychiatrist consulted was: JESUS. The admitting Psychiatrist will be : JESUS  The admitting Diagnosis is: NA. The Payor source is [facility] [address] MEDICARE PART A & B    Section II - Integrated Summary  Summary:  Pt presented to the ED due to some emotional distress as a result of her  of 15 years leaving her. Pt reported that her and her  have been together for 13 years and  for eight years. She stated that three weeks ago, he left with no explanation. This has caused her to be depressed and overwhelmed and has had difficulty functioning in day to day activities. 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 She reported that she has every intention of picking school back up however at this moment she can't concentrate. She stated that this life changing event has affected \"everything\" in her life. Pt denied SI/HI/AVH. She agreed that she needs resources. Pt was provided with resources for a therapist, Psychiatrist and grief support groups. Writer also recommended to the doctor to provide something for anxiety today if the doctor deemed appropriate. The patienthas demonstrated mental capacity to provide informed consent. The information is given by the patient. The Chief Complaint is that  left her and now she is depressed and overwhelmed. The Precipitant Factors are  walked out without an explanation. Previous Hospitalizations: none  The patient has not previously been in restraints.   Current Psychiatrist and/or  is none at this time. Lethality Assessment:    The potential for suicide noted by the following: not noted. The potential for homicide is not noted. The patient has not been a perpetrator of sexual or physical abuse. There are not pending charges. The patient is not felt to be at risk for self harm or harm to others. The attending nurse was advised that pt is not at risk of self harm and will be discharging with resources. Section III - Psychosocial  The patient's overall mood and attitude is sad and tearful. Feelings of helplessness and hopelessness are observed by affect and verbalization. Generalized anxiety is observed by affect and verbalization. Panic is not observed. Phobias are not observed. Obsessive compulsive tendencies are not observed. Section IV - Mental Status Exam  The patient's appearance shows no evidence of impairment. The patient's behavior shows no evidence of impairment. The patient is oriented to time, place, person and situation. The patient's speech shows no evidence of impairment. The patient's mood is sad. The range of affect is congruent with her mood. The patient's thought content demonstrates no evidence of impairment. The thought process shows no evidence of impairment. The patient's perception shows no evidence of impairment. The patient's memory shows no evidence of impairment. The patient's appetite is decreased and shows signs of weight loss. The patient's sleep has evidence of insomnia. The patient's insight shows no evidence of impairment. The patient's judgement shows no evidence of impairment. Section V - Substance Abuse  The patient denied use. Section VI - Living Arrangements  The patient is . The patient lives alone. The patient does plan to return home upon discharge. The patient does not have legal issues pending. The patient's source of income comes from employment.   Yarsani and cultural practices have been noted as [place]. The patient's greatest support comes from her mother who she talks to on the phone. The patient has not been in an event described as horrible or outside the realm of ordinary life experience either currently or in the past.  The patient has not been a victim of sexual/physical abuse. 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 The patient is currently employed and speaks [place] as a primary language. The patient has no communication impairments affecting communication. The patient's preference for learning can be described as: can read and write adequately.   The patient's hearing is normal.  The patient's vision is normal.      Naomi Gold, [address], CSOTP

## 2023-03-25 NOTE — ED PROVIDER NOTES
[facility] EMERGENCY DEPT  EMERGENCY DEPARTMENT HISTORY AND PHYSICAL EXAM      Date: 3/25/2023  Patient Name: Gisela Simmons  MRN: [id number]  [place]: 1975  Date of evaluation: 3/25/2023  Provider: Doyle Anand NP   Note Started: 8:40 AM 3/25/23    HISTORY OF PRESENT ILLNESS     Chief Complaint   Patient presents with    Mental Health Problem    Anxiety       History Provided By: Patient    HPI: Gisela Simmons is a 49 y.o. female history of asthma, bipolar, HTN, CVA, and migraine presents with anxiety and depression. Patient states her spouse left her and she has lost her home. She moved here to be with him. She is not currently employed and previously dropped out of school. She is denying SI, HI, self-harm, or hallucinations. Denies drug and alcohol use. No recent trauma, infections, or SI attempts. States she has no local resources or family available. PAST MEDICAL HISTORY   Past Medical History:  Past Medical History:   Diagnosis Date    Asthma     Bipolar 1 disorder ([facility] [address]     Environmental and seasonal allergies     Hypertension     Migraine     Stroke [facility])     2017 august hemmorragic stroke    TIA (transient ischemic attack)        Past Surgical History:  Past Surgical History:   Procedure Laterality Date    HX CHOLECYSTECTOMY         Family History:  History reviewed. No pertinent family history. Social History:  Social History     Tobacco Use    Smoking status: Every Day     Packs/day: 0.50     Types: Cigarettes    Smokeless tobacco: Never   Substance Use Topics    Alcohol use: No    Drug use: Yes     Types: Marijuana       Allergies:   Allergies   Allergen Reactions    Imitrex [Sumatriptan Succinate] Anaphylaxis    Fioricet [Butalbital-Acetaminophen-Caff] Nausea and Vomiting    Penicillins Itching     Throat itchy    Tramadol Nausea and Vomiting       PCP: Ha Bond MD    Current Meds:   Discharge Medication List as of 3/25/2023  9:52 AM        CONTINUE these medications which have NOT CHANGED    Details   estradioL (ESTRACE) 1 mg tablet Take 1 mg by mouth daily. , Historical Med      methylPREDNISolone (Medrol, Ronen,) 4 mg tablet Take as directed, Normal, Disp-1 Dose Pack, R-0      apixaban (Eliquis) 5 mg tablet Take 5 mg by mouth two (2) times a day., Historical Med      atorvastatin (LIPITOR) 10 mg tablet Take 40 mg by mouth daily. , Historical Med      amLODIPine (Norvasc) 10 mg tablet Take 10 mg by mouth daily. , Historical Med      hydrochlorothiazide (HYDRODIURIL) 25 mg tablet Take 25 mg by mouth daily. , Historical Med             PHYSICAL EXAM     ED Triage Vitals   ED Encounter Vitals Group      BP       Pulse       Resp       Temp       Temp src       SpO2       Weight       Height       Physical Exam  Vitals and nursing note reviewed. HENT:      Head: Normocephalic. Nose: Nose normal.   Eyes:      Extraocular Movements: Extraocular movements intact. Pupils: Pupils are equal, round, and reactive to light. Cardiovascular:      Rate and Rhythm: Normal rate and regular rhythm. Pulses: Normal pulses. Heart sounds: Normal heart sounds. Pulmonary:      Effort: Pulmonary effort is normal.      Breath sounds: Normal breath sounds. Abdominal:      Palpations: Abdomen is soft. Musculoskeletal:         General: Normal range of motion. Cervical back: Normal range of motion. Skin:     General: Skin is warm and dry. Neurological:      General: No focal deficit present. Mental Status: She is alert. Psychiatric:         Attention and Perception: Attention normal. She does not perceive auditory or visual hallucinations. Mood and Affect: Mood is anxious and depressed. Affect is tearful. Speech: Speech normal.         Behavior: Behavior is not agitated, aggressive or withdrawn. Behavior is cooperative. Thought Content:  Thought content normal. Thought content is not paranoid or delusional. Thought content does not include homicidal or suicidal ideation. Thought content does not include homicidal or suicidal plan. Judgment: Judgment normal.         SCREENINGS        No data recorded      LAB, EKG AND DIAGNOSTIC RESULTS   Labs:      Radiologic Studies:  Non-plain film images such as CT, Ultrasound and MRI are read by the radiologist. Plain radiographic images are visualized and preliminarily interpreted by the ED Physician with the following findings: Not applicable    Interpretation per the Radiologist below, if available at the time of this note:  No results found. PROCEDURES   Unless otherwise noted below, none. Procedures      CRITICAL CARE TIME   None    ED COURSE and DIFFERENTIAL DIAGNOSIS/MDM   CC/HPI Summary, DDx, ED Course, and Reassessment:     Records Reviewed (source and summary of external notes): Prior medical records and Nursing notes    Vitals:    Vitals:    03/25/23 0842 03/25/23 0843   BP:  122/89   Pulse:  98   Resp:  19   Temp:  98.7 °F (37.1 °C)   SpO2:  99%   Weight: 108 kg (238 lb)    Height: 5' 9\" (1.753 m)         ED Course as of 03/26/23 1747   Sat Mar 25, 2023   0845 Triage note states - Pt arrives feeling anxious and depressed secondary to spouse leaving her and being kicked out of her home. Reports she is unable to work and dropped out of school. Tearful in triage. Denies SI/HI/AVH   [KW]   0906 Xiomara Zeng, from [facility], at bedside. [ZC]   0194 76-year-old female presents with anxiety and depression. She is tearful, cooperative, and rational.  She denies physical symptoms or complaint. Denies SI/HI/self-harm. [facility]SMART consulted and provided evaluation. Patient given resources. Hydroxyzine ordered and prescription provided with education and allergy review. Diagnosis and care plan counseling provided. Randy Swann agrees to follow-up as recommended, or return to the ED if her symptoms worsen. Warning signs and return precautions discussed.  She is in agreement with plan of care, verbalized understanding, and questions answered. [KW]      ED Course User Index  [KW] Breonna Singh NP       Disposition Considerations (Tests not done, Shared Decision Making, Pt Expectation of Test or Treatment.):     Patient was given the following medications:  Medications   hydrOXYzine pamoate (VISTARIL) capsule 50 mg (50 mg Oral Given 3/25/23 0951)       CONSULTS: (Who and What was discussed)  None     Social Determinants affecting Dx or Tx: Patient lacks support at home or lives alone. Patient cannot afford medications. Patient does not have insurance. Patient is homeless. FINAL IMPRESSION     1. Depression, unspecified depression type          DISPOSITION/PLAN   Discharged    Discharge Note: The patient is stable for discharge home. The signs, symptoms, diagnosis, and discharge instructions have been discussed, understanding conveyed, and agreed upon. The patient is to follow up as recommended or return to ER should their symptoms worsen. PATIENT REFERRED TO:  Follow-up Information       Follow up With Specialties Details Why [address] [facility] EMERGENCY DEPT Emergency Medicine  If symptoms worsen [address] [facility] [address] [phone]              DISCHARGE MEDICATIONS:  Discharge Medication List as of 3/25/2023  9:52 AM        START taking these medications    Details   hydrOXYzine HCL (ATARAX) 50 mg tablet Take 1 Tablet by mouth every six (6) hours as needed for Anxiety for up to 10 days. , Normal, Disp-20 Tablet, R-0           CONTINUE these medications which have NOT CHANGED    Details   estradioL (ESTRACE) 1 mg tablet Take 1 mg by mouth daily. , Historical Med      methylPREDNISolone (Medrol, Ronen,) 4 mg tablet Take as directed, Normal, Disp-1 Dose Pack, R-0      apixaban (Eliquis) 5 mg tablet Take 5 mg by mouth two (2) times a day., Historical Med      atorvastatin (LIPITOR) 10 mg tablet Take 40 mg by mouth daily. , Historical Med      amLODIPine (Norvasc) 10 mg tablet Take 10 mg by mouth daily. , Historical Med      hydrochlorothiazide (HYDRODIURIL) 25 mg tablet Take 25 mg by mouth daily. , Historical Med               DISCONTINUED MEDICATIONS:  Discharge Medication List as of 3/25/2023  9:52 AM          I am the Primary Clinician of Record: Stalin Kurtz NP (electronically signed)    (Please note that parts of this dictation were completed with voice recognition software. Quite often unanticipated grammatical, syntax, homophones, and other interpretive errors are inadvertently transcribed by the computer software. Please disregards these errors.  Please excuse any errors that have escaped final proofreading.)

## 2023-03-25 NOTE — ED TRIAGE NOTES
Pt arrives feeling anxious and depressed secondary to spouse leaving her and being kicked out of her home. Reports she is unable to work and dropped out of school. Tearful in triage.  Denies SI/HI/AVH